# Patient Record
Sex: MALE | Race: WHITE | HISPANIC OR LATINO | Employment: FULL TIME | URBAN - METROPOLITAN AREA
[De-identification: names, ages, dates, MRNs, and addresses within clinical notes are randomized per-mention and may not be internally consistent; named-entity substitution may affect disease eponyms.]

---

## 2017-05-12 ENCOUNTER — TRANSCRIBE ORDERS (OUTPATIENT)
Dept: ADMINISTRATIVE | Facility: HOSPITAL | Age: 37
End: 2017-05-12

## 2017-05-12 DIAGNOSIS — N20.0 URIC ACID NEPHROLITHIASIS: Primary | ICD-10-CM

## 2017-05-12 DIAGNOSIS — R31.29 MICROSCOPIC HEMATURIA: ICD-10-CM

## 2017-05-19 ENCOUNTER — HOSPITAL ENCOUNTER (OUTPATIENT)
Dept: RADIOLOGY | Facility: HOSPITAL | Age: 37
Discharge: HOME | End: 2017-05-19
Attending: UROLOGY
Payer: COMMERCIAL

## 2017-05-19 DIAGNOSIS — N20.0 URIC ACID NEPHROLITHIASIS: ICD-10-CM

## 2017-05-19 DIAGNOSIS — R31.29 MICROSCOPIC HEMATURIA: ICD-10-CM

## 2017-05-19 PROCEDURE — 74176 CT ABD & PELVIS W/O CONTRAST: CPT

## 2017-08-04 ENCOUNTER — APPOINTMENT (OUTPATIENT)
Dept: LAB | Facility: HOSPITAL | Age: 37
End: 2017-08-04
Attending: UROLOGY
Payer: COMMERCIAL

## 2017-08-04 ENCOUNTER — TRANSCRIBE ORDERS (OUTPATIENT)
Dept: LAB | Facility: HOSPITAL | Age: 37
End: 2017-08-04

## 2017-08-04 DIAGNOSIS — Z01.818 PREOP EXAMINATION: Primary | ICD-10-CM

## 2017-08-04 DIAGNOSIS — Z01.818 PREOP EXAMINATION: ICD-10-CM

## 2017-08-04 LAB
ANION GAP SERPL CALCULATED.3IONS-SCNC: 7 MMOL/L (ref 4–13)
BUN SERPL-MCNC: 17 MG/DL (ref 5–25)
CALCIUM SERPL-MCNC: 9 MG/DL (ref 8.3–10.1)
CHLORIDE SERPL-SCNC: 104 MMOL/L (ref 100–108)
CO2 SERPL-SCNC: 30 MMOL/L (ref 21–32)
CREAT SERPL-MCNC: 1.19 MG/DL (ref 0.6–1.3)
ERYTHROCYTE [DISTWIDTH] IN BLOOD BY AUTOMATED COUNT: 13.7 % (ref 11.6–15.1)
GFR SERPL CREATININE-BSD FRML MDRD: 77 ML/MIN/1.73SQ M
GLUCOSE SERPL-MCNC: 96 MG/DL (ref 65–140)
HCT VFR BLD AUTO: 44.2 % (ref 36.5–49.3)
HGB BLD-MCNC: 15.2 G/DL (ref 12–17)
MCH RBC QN AUTO: 30.5 PG (ref 26.8–34.3)
MCHC RBC AUTO-ENTMCNC: 34.4 G/DL (ref 31.4–37.4)
MCV RBC AUTO: 89 FL (ref 82–98)
PLATELET # BLD AUTO: 251 THOUSANDS/UL (ref 149–390)
PMV BLD AUTO: 11.3 FL (ref 8.9–12.7)
POTASSIUM SERPL-SCNC: 4.1 MMOL/L (ref 3.5–5.3)
RBC # BLD AUTO: 4.99 MILLION/UL (ref 3.88–5.62)
SODIUM SERPL-SCNC: 141 MMOL/L (ref 136–145)
WBC # BLD AUTO: 6.99 THOUSAND/UL (ref 4.31–10.16)

## 2017-08-04 PROCEDURE — 85027 COMPLETE CBC AUTOMATED: CPT

## 2017-08-04 PROCEDURE — 80048 BASIC METABOLIC PNL TOTAL CA: CPT

## 2017-08-04 PROCEDURE — 36415 COLL VENOUS BLD VENIPUNCTURE: CPT

## 2017-08-07 ENCOUNTER — TRANSCRIBE ORDERS (OUTPATIENT)
Dept: ADMINISTRATIVE | Age: 37
End: 2017-08-07

## 2017-08-07 ENCOUNTER — APPOINTMENT (OUTPATIENT)
Dept: RADIOLOGY | Age: 37
End: 2017-08-07
Payer: COMMERCIAL

## 2017-08-07 DIAGNOSIS — N20.0 URIC ACID NEPHROLITHIASIS: ICD-10-CM

## 2017-08-07 DIAGNOSIS — N20.0 URIC ACID NEPHROLITHIASIS: Primary | ICD-10-CM

## 2017-08-07 PROCEDURE — 74000 HB X-RAY EXAM OF ABDOMEN (SINGLE ANTEROPOSTERIOR VIEW): CPT

## 2017-08-09 ENCOUNTER — ANESTHESIA EVENT (OUTPATIENT)
Dept: PERIOP | Facility: HOSPITAL | Age: 37
End: 2017-08-09
Payer: COMMERCIAL

## 2017-08-10 ENCOUNTER — HOSPITAL ENCOUNTER (OUTPATIENT)
Facility: HOSPITAL | Age: 37
Setting detail: OUTPATIENT SURGERY
Discharge: HOME | End: 2017-08-10
Attending: UROLOGY | Admitting: UROLOGY
Payer: COMMERCIAL

## 2017-08-10 ENCOUNTER — ANESTHESIA (OUTPATIENT)
Dept: PERIOP | Facility: HOSPITAL | Age: 37
End: 2017-08-10
Payer: COMMERCIAL

## 2017-08-10 VITALS
WEIGHT: 199 LBS | OXYGEN SATURATION: 94 % | HEART RATE: 55 BPM | RESPIRATION RATE: 18 BRPM | HEIGHT: 69 IN | SYSTOLIC BLOOD PRESSURE: 142 MMHG | TEMPERATURE: 97.9 F | DIASTOLIC BLOOD PRESSURE: 91 MMHG | BODY MASS INDEX: 29.47 KG/M2

## 2017-08-10 DIAGNOSIS — N20.0 RENAL CALCULI: ICD-10-CM

## 2017-08-10 PROCEDURE — 87086 URINE CULTURE/COLONY COUNT: CPT | Performed by: UROLOGY

## 2017-08-10 PROCEDURE — C2617 STENT, NON-COR, TEM W/O DEL: HCPCS | Performed by: UROLOGY

## 2017-08-10 PROCEDURE — C1769 GUIDE WIRE: HCPCS | Performed by: UROLOGY

## 2017-08-10 DEVICE — STENT URETERAL 4.7FR 26CM INLAY OPTIMA
Type: IMPLANTABLE DEVICE | Site: URETER | Status: NON-FUNCTIONAL
Removed: 2017-08-24

## 2017-08-10 RX ORDER — PROPOFOL 10 MG/ML
INJECTION, EMULSION INTRAVENOUS AS NEEDED
Status: DISCONTINUED | OUTPATIENT
Start: 2017-08-10 | End: 2017-08-10 | Stop reason: SURG

## 2017-08-10 RX ORDER — FENTANYL CITRATE/PF 50 MCG/ML
50 SYRINGE (ML) INJECTION
Status: DISCONTINUED | OUTPATIENT
Start: 2017-08-10 | End: 2017-08-10 | Stop reason: HOSPADM

## 2017-08-10 RX ORDER — SODIUM CHLORIDE, SODIUM LACTATE, POTASSIUM CHLORIDE, CALCIUM CHLORIDE 600; 310; 30; 20 MG/100ML; MG/100ML; MG/100ML; MG/100ML
20 INJECTION, SOLUTION INTRAVENOUS CONTINUOUS
Status: DISCONTINUED | OUTPATIENT
Start: 2017-08-10 | End: 2017-08-10 | Stop reason: HOSPADM

## 2017-08-10 RX ORDER — CEFUROXIME AXETIL 500 MG/1
500 TABLET ORAL EVERY 12 HOURS SCHEDULED
Qty: 6 TABLET | Refills: 0 | Status: SHIPPED | OUTPATIENT
Start: 2017-08-10 | End: 2017-08-13

## 2017-08-10 RX ORDER — SODIUM CHLORIDE, SODIUM LACTATE, POTASSIUM CHLORIDE, CALCIUM CHLORIDE 600; 310; 30; 20 MG/100ML; MG/100ML; MG/100ML; MG/100ML
250 INJECTION, SOLUTION INTRAVENOUS CONTINUOUS
Status: DISCONTINUED | OUTPATIENT
Start: 2017-08-10 | End: 2017-08-10 | Stop reason: HOSPADM

## 2017-08-10 RX ORDER — ONDANSETRON 2 MG/ML
4 INJECTION INTRAMUSCULAR; INTRAVENOUS ONCE AS NEEDED
Status: DISCONTINUED | OUTPATIENT
Start: 2017-08-10 | End: 2017-08-10 | Stop reason: HOSPADM

## 2017-08-10 RX ORDER — FENTANYL CITRATE 50 UG/ML
INJECTION, SOLUTION INTRAMUSCULAR; INTRAVENOUS AS NEEDED
Status: DISCONTINUED | OUTPATIENT
Start: 2017-08-10 | End: 2017-08-10 | Stop reason: SURG

## 2017-08-10 RX ORDER — MIDAZOLAM HYDROCHLORIDE 1 MG/ML
INJECTION INTRAMUSCULAR; INTRAVENOUS AS NEEDED
Status: DISCONTINUED | OUTPATIENT
Start: 2017-08-10 | End: 2017-08-10 | Stop reason: SURG

## 2017-08-10 RX ORDER — MAGNESIUM HYDROXIDE 1200 MG/15ML
LIQUID ORAL AS NEEDED
Status: DISCONTINUED | OUTPATIENT
Start: 2017-08-10 | End: 2017-08-10 | Stop reason: HOSPADM

## 2017-08-10 RX ORDER — METOCLOPRAMIDE HYDROCHLORIDE 5 MG/ML
10 INJECTION INTRAMUSCULAR; INTRAVENOUS ONCE AS NEEDED
Status: DISCONTINUED | OUTPATIENT
Start: 2017-08-10 | End: 2017-08-10 | Stop reason: HOSPADM

## 2017-08-10 RX ORDER — OXYCODONE HYDROCHLORIDE AND ACETAMINOPHEN 5; 325 MG/1; MG/1
1 TABLET ORAL EVERY 4 HOURS PRN
Qty: 18 TABLET | Refills: 0 | Status: SHIPPED | OUTPATIENT
Start: 2017-08-10 | End: 2017-08-15

## 2017-08-10 RX ORDER — ONDANSETRON 2 MG/ML
INJECTION INTRAMUSCULAR; INTRAVENOUS AS NEEDED
Status: DISCONTINUED | OUTPATIENT
Start: 2017-08-10 | End: 2017-08-10 | Stop reason: SURG

## 2017-08-10 RX ORDER — SODIUM CHLORIDE, SODIUM LACTATE, POTASSIUM CHLORIDE, CALCIUM CHLORIDE 600; 310; 30; 20 MG/100ML; MG/100ML; MG/100ML; MG/100ML
INJECTION, SOLUTION INTRAVENOUS CONTINUOUS PRN
Status: DISCONTINUED | OUTPATIENT
Start: 2017-08-10 | End: 2017-08-10 | Stop reason: SURG

## 2017-08-10 RX ORDER — OXYCODONE HYDROCHLORIDE AND ACETAMINOPHEN 5; 325 MG/1; MG/1
1 TABLET ORAL EVERY 4 HOURS PRN
Status: DISCONTINUED | OUTPATIENT
Start: 2017-08-10 | End: 2017-08-10 | Stop reason: HOSPADM

## 2017-08-10 RX ADMIN — DEXAMETHASONE SODIUM PHOSPHATE 8 MG: 10 INJECTION INTRAMUSCULAR; INTRAVENOUS at 07:46

## 2017-08-10 RX ADMIN — FENTANYL CITRATE 25 MCG: 50 INJECTION, SOLUTION INTRAMUSCULAR; INTRAVENOUS at 07:43

## 2017-08-10 RX ADMIN — SODIUM CHLORIDE, SODIUM LACTATE, POTASSIUM CHLORIDE, AND CALCIUM CHLORIDE 250 ML/HR: .6; .31; .03; .02 INJECTION, SOLUTION INTRAVENOUS at 09:27

## 2017-08-10 RX ADMIN — SODIUM CHLORIDE, SODIUM LACTATE, POTASSIUM CHLORIDE, AND CALCIUM CHLORIDE: .6; .31; .03; .02 INJECTION, SOLUTION INTRAVENOUS at 07:26

## 2017-08-10 RX ADMIN — FENTANYL CITRATE 25 MCG: 50 INJECTION, SOLUTION INTRAMUSCULAR; INTRAVENOUS at 07:45

## 2017-08-10 RX ADMIN — PROPOFOL 230 MG: 10 INJECTION, EMULSION INTRAVENOUS at 07:38

## 2017-08-10 RX ADMIN — OXYCODONE HYDROCHLORIDE AND ACETAMINOPHEN 1 TABLET: 5; 325 TABLET ORAL at 10:06

## 2017-08-10 RX ADMIN — MIDAZOLAM HYDROCHLORIDE 2 MG: 1 INJECTION, SOLUTION INTRAMUSCULAR; INTRAVENOUS at 07:26

## 2017-08-10 RX ADMIN — FENTANYL CITRATE 25 MCG: 50 INJECTION, SOLUTION INTRAMUSCULAR; INTRAVENOUS at 07:59

## 2017-08-10 RX ADMIN — ONDANSETRON 4 MG: 2 INJECTION INTRAMUSCULAR; INTRAVENOUS at 07:47

## 2017-08-10 RX ADMIN — FENTANYL CITRATE 25 MCG: 50 INJECTION, SOLUTION INTRAMUSCULAR; INTRAVENOUS at 07:41

## 2017-08-10 RX ADMIN — CEFAZOLIN SODIUM 2000 MG: 2 SOLUTION INTRAVENOUS at 07:30

## 2017-08-12 LAB — BACTERIA UR CULT: NORMAL

## 2017-08-17 ENCOUNTER — TRANSCRIBE ORDERS (OUTPATIENT)
Dept: RADIOLOGY | Facility: HOSPITAL | Age: 37
End: 2017-08-17

## 2017-08-17 ENCOUNTER — HOSPITAL ENCOUNTER (OUTPATIENT)
Dept: RADIOLOGY | Facility: HOSPITAL | Age: 37
Discharge: HOME | End: 2017-08-17
Attending: UROLOGY
Payer: COMMERCIAL

## 2017-08-17 DIAGNOSIS — N20.0 CALCULUS OF KIDNEY: Primary | ICD-10-CM

## 2017-08-17 DIAGNOSIS — N20.0 CALCULUS OF KIDNEY: ICD-10-CM

## 2017-08-17 PROCEDURE — 74000 HB X-RAY EXAM OF ABDOMEN (SINGLE ANTEROPOSTERIOR VIEW): CPT

## 2017-08-23 ENCOUNTER — ANESTHESIA EVENT (OUTPATIENT)
Dept: PERIOP | Facility: HOSPITAL | Age: 37
End: 2017-08-23
Payer: COMMERCIAL

## 2017-08-24 ENCOUNTER — HOSPITAL ENCOUNTER (OUTPATIENT)
Facility: HOSPITAL | Age: 37
Setting detail: OUTPATIENT SURGERY
Discharge: HOME | End: 2017-08-24
Attending: UROLOGY | Admitting: UROLOGY
Payer: COMMERCIAL

## 2017-08-24 ENCOUNTER — APPOINTMENT (OUTPATIENT)
Dept: RADIOLOGY | Facility: HOSPITAL | Age: 37
End: 2017-08-24
Attending: UROLOGY
Payer: COMMERCIAL

## 2017-08-24 ENCOUNTER — ANESTHESIA (OUTPATIENT)
Dept: PERIOP | Facility: HOSPITAL | Age: 37
End: 2017-08-24
Payer: COMMERCIAL

## 2017-08-24 ENCOUNTER — HOSPITAL ENCOUNTER (OUTPATIENT)
Dept: RADIOLOGY | Facility: HOSPITAL | Age: 37
Setting detail: OUTPATIENT SURGERY
Discharge: HOME | End: 2017-08-24
Payer: COMMERCIAL

## 2017-08-24 VITALS
HEART RATE: 61 BPM | RESPIRATION RATE: 16 BRPM | WEIGHT: 202 LBS | BODY MASS INDEX: 29.92 KG/M2 | SYSTOLIC BLOOD PRESSURE: 141 MMHG | TEMPERATURE: 98.8 F | HEIGHT: 69 IN | OXYGEN SATURATION: 95 % | DIASTOLIC BLOOD PRESSURE: 82 MMHG

## 2017-08-24 DIAGNOSIS — N20.1 CALCULUS OF URETER: ICD-10-CM

## 2017-08-24 DIAGNOSIS — N20.0 RENAL CALCULUS: ICD-10-CM

## 2017-08-24 PROCEDURE — C1769 GUIDE WIRE: HCPCS | Performed by: UROLOGY

## 2017-08-24 PROCEDURE — C2617 STENT, NON-COR, TEM W/O DEL: HCPCS | Performed by: UROLOGY

## 2017-08-24 PROCEDURE — 74420 UROGRAPHY RTRGR +-KUB: CPT

## 2017-08-24 PROCEDURE — 87086 URINE CULTURE/COLONY COUNT: CPT | Performed by: UROLOGY

## 2017-08-24 PROCEDURE — 82360 CALCULUS ASSAY QUANT: CPT | Performed by: UROLOGY

## 2017-08-24 PROCEDURE — 74000 HB X-RAY EXAM OF ABDOMEN (SINGLE ANTEROPOSTERIOR VIEW): CPT

## 2017-08-24 DEVICE — IMPLANTABLE DEVICE
Type: IMPLANTABLE DEVICE | Site: URETER | Status: NON-FUNCTIONAL
Removed: 2017-09-14

## 2017-08-24 RX ORDER — DEXAMETHASONE SODIUM PHOSPHATE 4 MG/ML
INJECTION, SOLUTION INTRA-ARTICULAR; INTRALESIONAL; INTRAMUSCULAR; INTRAVENOUS; SOFT TISSUE AS NEEDED
Status: DISCONTINUED | OUTPATIENT
Start: 2017-08-24 | End: 2017-08-24 | Stop reason: SURG

## 2017-08-24 RX ORDER — OXYCODONE HYDROCHLORIDE AND ACETAMINOPHEN 5; 325 MG/1; MG/1
1 TABLET ORAL EVERY 4 HOURS PRN
Status: DISCONTINUED | OUTPATIENT
Start: 2017-08-24 | End: 2017-08-24 | Stop reason: HOSPADM

## 2017-08-24 RX ORDER — MIDAZOLAM HYDROCHLORIDE 1 MG/ML
INJECTION INTRAMUSCULAR; INTRAVENOUS AS NEEDED
Status: DISCONTINUED | OUTPATIENT
Start: 2017-08-24 | End: 2017-08-24 | Stop reason: SURG

## 2017-08-24 RX ORDER — SODIUM CHLORIDE, SODIUM LACTATE, POTASSIUM CHLORIDE, CALCIUM CHLORIDE 600; 310; 30; 20 MG/100ML; MG/100ML; MG/100ML; MG/100ML
125 INJECTION, SOLUTION INTRAVENOUS CONTINUOUS
Status: DISCONTINUED | OUTPATIENT
Start: 2017-08-24 | End: 2017-08-24 | Stop reason: HOSPADM

## 2017-08-24 RX ORDER — DIPHENHYDRAMINE HYDROCHLORIDE 50 MG/ML
12.5 INJECTION INTRAMUSCULAR; INTRAVENOUS ONCE AS NEEDED
Status: DISCONTINUED | OUTPATIENT
Start: 2017-08-24 | End: 2017-08-24 | Stop reason: HOSPADM

## 2017-08-24 RX ORDER — ROCURONIUM BROMIDE 10 MG/ML
INJECTION, SOLUTION INTRAVENOUS AS NEEDED
Status: DISCONTINUED | OUTPATIENT
Start: 2017-08-24 | End: 2017-08-24 | Stop reason: SURG

## 2017-08-24 RX ORDER — MAGNESIUM HYDROXIDE 1200 MG/15ML
LIQUID ORAL AS NEEDED
Status: DISCONTINUED | OUTPATIENT
Start: 2017-08-24 | End: 2017-08-24 | Stop reason: HOSPADM

## 2017-08-24 RX ORDER — GLYCOPYRROLATE 0.2 MG/ML
INJECTION INTRAMUSCULAR; INTRAVENOUS AS NEEDED
Status: DISCONTINUED | OUTPATIENT
Start: 2017-08-24 | End: 2017-08-24 | Stop reason: SURG

## 2017-08-24 RX ORDER — OXYCODONE HYDROCHLORIDE AND ACETAMINOPHEN 5; 325 MG/1; MG/1
1 TABLET ORAL EVERY 4 HOURS PRN
Qty: 24 TABLET | Refills: 0 | Status: SHIPPED | OUTPATIENT
Start: 2017-08-24 | End: 2017-09-03

## 2017-08-24 RX ORDER — METOCLOPRAMIDE HYDROCHLORIDE 5 MG/ML
INJECTION INTRAMUSCULAR; INTRAVENOUS AS NEEDED
Status: DISCONTINUED | OUTPATIENT
Start: 2017-08-24 | End: 2017-08-24 | Stop reason: SURG

## 2017-08-24 RX ORDER — FENTANYL CITRATE/PF 50 MCG/ML
50 SYRINGE (ML) INJECTION
Status: DISCONTINUED | OUTPATIENT
Start: 2017-08-24 | End: 2017-08-24 | Stop reason: HOSPADM

## 2017-08-24 RX ORDER — PROPOFOL 10 MG/ML
INJECTION, EMULSION INTRAVENOUS AS NEEDED
Status: DISCONTINUED | OUTPATIENT
Start: 2017-08-24 | End: 2017-08-24 | Stop reason: SURG

## 2017-08-24 RX ORDER — LIDOCAINE HYDROCHLORIDE 10 MG/ML
INJECTION, SOLUTION INFILTRATION; PERINEURAL AS NEEDED
Status: DISCONTINUED | OUTPATIENT
Start: 2017-08-24 | End: 2017-08-24 | Stop reason: SURG

## 2017-08-24 RX ORDER — CEFUROXIME AXETIL 500 MG/1
500 TABLET ORAL EVERY 12 HOURS SCHEDULED
Qty: 10 TABLET | Refills: 0 | Status: SHIPPED | OUTPATIENT
Start: 2017-08-24 | End: 2017-08-29

## 2017-08-24 RX ORDER — ONDANSETRON 2 MG/ML
INJECTION INTRAMUSCULAR; INTRAVENOUS AS NEEDED
Status: DISCONTINUED | OUTPATIENT
Start: 2017-08-24 | End: 2017-08-24 | Stop reason: SURG

## 2017-08-24 RX ORDER — OXYCODONE HYDROCHLORIDE AND ACETAMINOPHEN 5; 325 MG/1; MG/1
1 TABLET ORAL EVERY 4 HOURS PRN
COMMUNITY
End: 2017-11-06 | Stop reason: ALTCHOICE

## 2017-08-24 RX ORDER — ONDANSETRON 2 MG/ML
4 INJECTION INTRAMUSCULAR; INTRAVENOUS ONCE AS NEEDED
Status: DISCONTINUED | OUTPATIENT
Start: 2017-08-24 | End: 2017-08-24 | Stop reason: HOSPADM

## 2017-08-24 RX ORDER — PROMETHAZINE HYDROCHLORIDE 25 MG/ML
6.25 INJECTION, SOLUTION INTRAMUSCULAR; INTRAVENOUS AS NEEDED
Status: DISCONTINUED | OUTPATIENT
Start: 2017-08-24 | End: 2017-08-24 | Stop reason: HOSPADM

## 2017-08-24 RX ORDER — ALBUTEROL SULFATE 2.5 MG/3ML
2.5 SOLUTION RESPIRATORY (INHALATION) AS NEEDED
Status: DISCONTINUED | OUTPATIENT
Start: 2017-08-24 | End: 2017-08-24 | Stop reason: HOSPADM

## 2017-08-24 RX ORDER — FENTANYL CITRATE 50 UG/ML
INJECTION, SOLUTION INTRAMUSCULAR; INTRAVENOUS AS NEEDED
Status: DISCONTINUED | OUTPATIENT
Start: 2017-08-24 | End: 2017-08-24 | Stop reason: SURG

## 2017-08-24 RX ORDER — IBUPROFEN 200 MG
TABLET ORAL EVERY 6 HOURS PRN
COMMUNITY
End: 2017-09-15

## 2017-08-24 RX ADMIN — FENTANYL CITRATE 50 MCG: 50 INJECTION, SOLUTION INTRAMUSCULAR; INTRAVENOUS at 10:30

## 2017-08-24 RX ADMIN — ROCURONIUM BROMIDE 5 MG: 10 INJECTION, SOLUTION INTRAVENOUS at 11:31

## 2017-08-24 RX ADMIN — FENTANYL CITRATE 50 MCG: 50 INJECTION, SOLUTION INTRAMUSCULAR; INTRAVENOUS at 10:21

## 2017-08-24 RX ADMIN — ROCURONIUM BROMIDE 35 MG: 10 INJECTION, SOLUTION INTRAVENOUS at 10:15

## 2017-08-24 RX ADMIN — FENTANYL CITRATE 50 MCG: 50 INJECTION, SOLUTION INTRAMUSCULAR; INTRAVENOUS at 11:31

## 2017-08-24 RX ADMIN — ROCURONIUM BROMIDE 5 MG: 10 INJECTION, SOLUTION INTRAVENOUS at 10:42

## 2017-08-24 RX ADMIN — NEOSTIGMINE METHYLSULFATE 3 MG: 1 INJECTION, SOLUTION INTRAMUSCULAR; INTRAVENOUS; SUBCUTANEOUS at 12:05

## 2017-08-24 RX ADMIN — OXYCODONE HYDROCHLORIDE AND ACETAMINOPHEN 1 TABLET: 5; 325 TABLET ORAL at 13:47

## 2017-08-24 RX ADMIN — GLYCOPYRROLATE 0.4 MG: 0.2 INJECTION, SOLUTION INTRAMUSCULAR; INTRAVENOUS at 12:05

## 2017-08-24 RX ADMIN — SODIUM CHLORIDE, SODIUM LACTATE, POTASSIUM CHLORIDE, AND CALCIUM CHLORIDE 125 ML/HR: .6; .31; .03; .02 INJECTION, SOLUTION INTRAVENOUS at 09:42

## 2017-08-24 RX ADMIN — FENTANYL CITRATE 50 MCG: 50 INJECTION, SOLUTION INTRAMUSCULAR; INTRAVENOUS at 11:55

## 2017-08-24 RX ADMIN — MIDAZOLAM HYDROCHLORIDE 2 MG: 1 INJECTION, SOLUTION INTRAMUSCULAR; INTRAVENOUS at 10:06

## 2017-08-24 RX ADMIN — ONDANSETRON 4 MG: 2 INJECTION INTRAMUSCULAR; INTRAVENOUS at 12:05

## 2017-08-24 RX ADMIN — ONDANSETRON 4 MG: 2 INJECTION INTRAMUSCULAR; INTRAVENOUS at 10:30

## 2017-08-24 RX ADMIN — DEXAMETHASONE SODIUM PHOSPHATE 4 MG: 4 INJECTION, SOLUTION INTRAMUSCULAR; INTRAVENOUS at 10:21

## 2017-08-24 RX ADMIN — ROCURONIUM BROMIDE 5 MG: 10 INJECTION, SOLUTION INTRAVENOUS at 11:06

## 2017-08-24 RX ADMIN — METOCLOPRAMIDE 10 MG: 5 INJECTION, SOLUTION INTRAMUSCULAR; INTRAVENOUS at 10:29

## 2017-08-24 RX ADMIN — LIDOCAINE HYDROCHLORIDE 50 MG: 10 INJECTION, SOLUTION INFILTRATION; PERINEURAL at 10:15

## 2017-08-24 RX ADMIN — PROPOFOL 200 MG: 10 INJECTION, EMULSION INTRAVENOUS at 10:15

## 2017-08-24 RX ADMIN — CEFAZOLIN SODIUM 2000 MG: 2 SOLUTION INTRAVENOUS at 10:15

## 2017-08-26 LAB — BACTERIA UR CULT: NORMAL

## 2017-08-31 ENCOUNTER — TRANSCRIBE ORDERS (OUTPATIENT)
Dept: RADIOLOGY | Facility: HOSPITAL | Age: 37
End: 2017-08-31

## 2017-08-31 ENCOUNTER — HOSPITAL ENCOUNTER (OUTPATIENT)
Dept: RADIOLOGY | Facility: HOSPITAL | Age: 37
Discharge: HOME | End: 2017-08-31
Attending: UROLOGY
Payer: COMMERCIAL

## 2017-08-31 DIAGNOSIS — N20.0 KIDNEY STONE: ICD-10-CM

## 2017-08-31 DIAGNOSIS — N20.0 KIDNEY STONE: Primary | ICD-10-CM

## 2017-08-31 PROCEDURE — 74000 HB X-RAY EXAM OF ABDOMEN (SINGLE ANTEROPOSTERIOR VIEW): CPT

## 2017-09-06 LAB
CA PHOS MFR STONE: 5 %
CALCIUM OXALATE DIHYDRATE MFR STONE IR: 5 %
COLOR STONE: NORMAL
COM MFR STONE: 90 %
COMMENT-STONE3: NORMAL
COMPOSITION: NORMAL
LABORATORY COMMENT REPORT: NORMAL
NIDUS STONE QL: NORMAL
PHOTO: NORMAL
SIZE STONE: NORMAL MM
STONE ANALYSIS-IMP: NORMAL
STONE ANALYSIS-IMP: NORMAL
WT STONE: 33 MG

## 2017-09-14 ENCOUNTER — ANESTHESIA (OUTPATIENT)
Dept: PERIOP | Facility: HOSPITAL | Age: 37
End: 2017-09-14
Payer: COMMERCIAL

## 2017-09-14 ENCOUNTER — HOSPITAL ENCOUNTER (OUTPATIENT)
Facility: HOSPITAL | Age: 37
Setting detail: OUTPATIENT SURGERY
Discharge: HOME | End: 2017-09-14
Attending: UROLOGY | Admitting: UROLOGY
Payer: COMMERCIAL

## 2017-09-14 ENCOUNTER — HOSPITAL ENCOUNTER (OUTPATIENT)
Facility: HOSPITAL | Age: 37
Setting detail: OBSERVATION
Discharge: HOME | End: 2017-09-15
Attending: EMERGENCY MEDICINE | Admitting: UROLOGY
Payer: COMMERCIAL

## 2017-09-14 ENCOUNTER — APPOINTMENT (OUTPATIENT)
Dept: RADIOLOGY | Facility: HOSPITAL | Age: 37
End: 2017-09-14
Payer: COMMERCIAL

## 2017-09-14 ENCOUNTER — ANESTHESIA EVENT (OUTPATIENT)
Dept: PERIOP | Facility: HOSPITAL | Age: 37
End: 2017-09-14
Payer: COMMERCIAL

## 2017-09-14 VITALS
OXYGEN SATURATION: 99 % | DIASTOLIC BLOOD PRESSURE: 70 MMHG | HEIGHT: 69 IN | RESPIRATION RATE: 16 BRPM | HEART RATE: 74 BPM | TEMPERATURE: 98.4 F | BODY MASS INDEX: 29.77 KG/M2 | WEIGHT: 201 LBS | SYSTOLIC BLOOD PRESSURE: 128 MMHG

## 2017-09-14 DIAGNOSIS — R11.2 NAUSEA AND VOMITING: ICD-10-CM

## 2017-09-14 DIAGNOSIS — R10.9 FLANK PAIN, ACUTE: Primary | ICD-10-CM

## 2017-09-14 DIAGNOSIS — N20.1 URETERAL CALCULI: ICD-10-CM

## 2017-09-14 PROCEDURE — C2617 STENT, NON-COR, TEM W/O DEL: HCPCS | Performed by: UROLOGY

## 2017-09-14 PROCEDURE — 74420 UROGRAPHY RTRGR +-KUB: CPT

## 2017-09-14 PROCEDURE — C1769 GUIDE WIRE: HCPCS | Performed by: UROLOGY

## 2017-09-14 PROCEDURE — 87086 URINE CULTURE/COLONY COUNT: CPT | Performed by: UROLOGY

## 2017-09-14 PROCEDURE — 81002 URINALYSIS NONAUTO W/O SCOPE: CPT | Performed by: EMERGENCY MEDICINE

## 2017-09-14 PROCEDURE — 82360 CALCULUS ASSAY QUANT: CPT | Performed by: UROLOGY

## 2017-09-14 DEVICE — STENT URETERAL 4.7FR 26CM INLAY OPTIMA
Type: IMPLANTABLE DEVICE | Site: URETER | Status: NON-FUNCTIONAL
Removed: 2017-11-09

## 2017-09-14 RX ORDER — OXYCODONE HYDROCHLORIDE AND ACETAMINOPHEN 5; 325 MG/1; MG/1
1 TABLET ORAL EVERY 4 HOURS PRN
Status: DISCONTINUED | OUTPATIENT
Start: 2017-09-14 | End: 2017-09-14 | Stop reason: HOSPADM

## 2017-09-14 RX ORDER — SODIUM CHLORIDE, SODIUM LACTATE, POTASSIUM CHLORIDE, CALCIUM CHLORIDE 600; 310; 30; 20 MG/100ML; MG/100ML; MG/100ML; MG/100ML
50 INJECTION, SOLUTION INTRAVENOUS CONTINUOUS
Status: DISCONTINUED | OUTPATIENT
Start: 2017-09-14 | End: 2017-09-14 | Stop reason: HOSPADM

## 2017-09-14 RX ORDER — PROPOFOL 10 MG/ML
INJECTION, EMULSION INTRAVENOUS AS NEEDED
Status: DISCONTINUED | OUTPATIENT
Start: 2017-09-14 | End: 2017-09-14 | Stop reason: SURG

## 2017-09-14 RX ORDER — OXYCODONE HYDROCHLORIDE AND ACETAMINOPHEN 5; 325 MG/1; MG/1
1 TABLET ORAL EVERY 4 HOURS PRN
Qty: 18 TABLET | Refills: 0 | Status: SHIPPED | OUTPATIENT
Start: 2017-09-14 | End: 2017-09-15

## 2017-09-14 RX ORDER — ONDANSETRON 2 MG/ML
INJECTION INTRAMUSCULAR; INTRAVENOUS AS NEEDED
Status: DISCONTINUED | OUTPATIENT
Start: 2017-09-14 | End: 2017-09-14 | Stop reason: SURG

## 2017-09-14 RX ORDER — FENTANYL CITRATE 50 UG/ML
INJECTION, SOLUTION INTRAMUSCULAR; INTRAVENOUS AS NEEDED
Status: DISCONTINUED | OUTPATIENT
Start: 2017-09-14 | End: 2017-09-14 | Stop reason: SURG

## 2017-09-14 RX ORDER — SODIUM CHLORIDE, SODIUM LACTATE, POTASSIUM CHLORIDE, CALCIUM CHLORIDE 600; 310; 30; 20 MG/100ML; MG/100ML; MG/100ML; MG/100ML
20 INJECTION, SOLUTION INTRAVENOUS CONTINUOUS
Status: DISCONTINUED | OUTPATIENT
Start: 2017-09-14 | End: 2017-09-14 | Stop reason: HOSPADM

## 2017-09-14 RX ORDER — MAGNESIUM HYDROXIDE 1200 MG/15ML
LIQUID ORAL AS NEEDED
Status: DISCONTINUED | OUTPATIENT
Start: 2017-09-14 | End: 2017-09-14 | Stop reason: HOSPADM

## 2017-09-14 RX ORDER — SODIUM CHLORIDE, SODIUM LACTATE, POTASSIUM CHLORIDE, CALCIUM CHLORIDE 600; 310; 30; 20 MG/100ML; MG/100ML; MG/100ML; MG/100ML
250 INJECTION, SOLUTION INTRAVENOUS CONTINUOUS
Status: CANCELLED | OUTPATIENT
Start: 2017-09-14

## 2017-09-14 RX ORDER — FUROSEMIDE 10 MG/ML
20 INJECTION INTRAMUSCULAR; INTRAVENOUS ONCE
Status: COMPLETED | OUTPATIENT
Start: 2017-09-14 | End: 2017-09-14

## 2017-09-14 RX ORDER — ONDANSETRON 2 MG/ML
4 INJECTION INTRAMUSCULAR; INTRAVENOUS ONCE AS NEEDED
Status: DISCONTINUED | OUTPATIENT
Start: 2017-09-14 | End: 2017-09-14 | Stop reason: HOSPADM

## 2017-09-14 RX ORDER — LIDOCAINE HYDROCHLORIDE 10 MG/ML
INJECTION, SOLUTION INFILTRATION; PERINEURAL AS NEEDED
Status: DISCONTINUED | OUTPATIENT
Start: 2017-09-14 | End: 2017-09-14 | Stop reason: SURG

## 2017-09-14 RX ORDER — FENTANYL CITRATE/PF 50 MCG/ML
25 SYRINGE (ML) INJECTION
Status: DISCONTINUED | OUTPATIENT
Start: 2017-09-14 | End: 2017-09-14 | Stop reason: HOSPADM

## 2017-09-14 RX ORDER — CEFUROXIME AXETIL 500 MG/1
500 TABLET ORAL EVERY 12 HOURS SCHEDULED
Qty: 6 TABLET | Refills: 0 | Status: SHIPPED | OUTPATIENT
Start: 2017-09-14 | End: 2017-09-17

## 2017-09-14 RX ADMIN — LIDOCAINE HYDROCHLORIDE 50 MG: 10 INJECTION, SOLUTION INFILTRATION; PERINEURAL at 12:25

## 2017-09-14 RX ADMIN — FUROSEMIDE 20 MG: 10 INJECTION, SOLUTION INTRAMUSCULAR; INTRAVENOUS at 14:57

## 2017-09-14 RX ADMIN — SODIUM CHLORIDE, SODIUM LACTATE, POTASSIUM CHLORIDE, AND CALCIUM CHLORIDE 50 ML/HR: .6; .31; .03; .02 INJECTION, SOLUTION INTRAVENOUS at 10:59

## 2017-09-14 RX ADMIN — FENTANYL CITRATE 25 MCG: 50 INJECTION, SOLUTION INTRAMUSCULAR; INTRAVENOUS at 12:50

## 2017-09-14 RX ADMIN — ONDANSETRON 4 MG: 2 INJECTION INTRAMUSCULAR; INTRAVENOUS at 12:25

## 2017-09-14 RX ADMIN — CEFAZOLIN SODIUM 2000 MG: 2 SOLUTION INTRAVENOUS at 12:31

## 2017-09-14 RX ADMIN — SODIUM CHLORIDE, SODIUM LACTATE, POTASSIUM CHLORIDE, AND CALCIUM CHLORIDE: .6; .31; .03; .02 INJECTION, SOLUTION INTRAVENOUS at 13:30

## 2017-09-14 RX ADMIN — FENTANYL CITRATE 25 MCG: 50 INJECTION, SOLUTION INTRAMUSCULAR; INTRAVENOUS at 13:59

## 2017-09-14 RX ADMIN — DEXAMETHASONE SODIUM PHOSPHATE 10 MG: 10 INJECTION INTRAMUSCULAR; INTRAVENOUS at 12:25

## 2017-09-14 RX ADMIN — PROPOFOL 200 MG: 10 INJECTION, EMULSION INTRAVENOUS at 12:25

## 2017-09-14 RX ADMIN — FENTANYL CITRATE 25 MCG: 50 INJECTION INTRAMUSCULAR; INTRAVENOUS at 15:08

## 2017-09-14 RX ADMIN — FENTANYL CITRATE 50 MCG: 50 INJECTION, SOLUTION INTRAMUSCULAR; INTRAVENOUS at 12:25

## 2017-09-15 VITALS
TEMPERATURE: 98.3 F | OXYGEN SATURATION: 96 % | RESPIRATION RATE: 18 BRPM | HEART RATE: 84 BPM | WEIGHT: 200.62 LBS | BODY MASS INDEX: 29.63 KG/M2 | SYSTOLIC BLOOD PRESSURE: 104 MMHG | DIASTOLIC BLOOD PRESSURE: 58 MMHG

## 2017-09-15 LAB
ALBUMIN SERPL BCP-MCNC: 3.6 G/DL (ref 3.5–5)
ALP SERPL-CCNC: 93 U/L (ref 46–116)
ALT SERPL W P-5'-P-CCNC: 18 U/L (ref 12–78)
ANION GAP SERPL CALCULATED.3IONS-SCNC: 7 MMOL/L (ref 4–13)
AST SERPL W P-5'-P-CCNC: 17 U/L (ref 5–45)
BACTERIA UR QL AUTO: ABNORMAL /HPF
BASOPHILS # BLD AUTO: 0 THOUSANDS/ΜL (ref 0–0.1)
BASOPHILS NFR BLD AUTO: 0 % (ref 0–1)
BILIRUB SERPL-MCNC: 0.43 MG/DL (ref 0.2–1)
BILIRUB UR QL STRIP: NEGATIVE
BUN SERPL-MCNC: 16 MG/DL (ref 5–25)
CALCIUM SERPL-MCNC: 8.7 MG/DL (ref 8.3–10.1)
CHLORIDE SERPL-SCNC: 102 MMOL/L (ref 100–108)
CLARITY UR: ABNORMAL
CLARITY, POC: NORMAL
CO2 SERPL-SCNC: 28 MMOL/L (ref 21–32)
COLOR UR: ABNORMAL
COLOR, POC: NORMAL
CREAT SERPL-MCNC: 1.3 MG/DL (ref 0.6–1.3)
EOSINOPHIL # BLD AUTO: 0 THOUSAND/ΜL (ref 0–0.61)
EOSINOPHIL NFR BLD AUTO: 0 % (ref 0–6)
ERYTHROCYTE [DISTWIDTH] IN BLOOD BY AUTOMATED COUNT: 13.9 % (ref 11.6–15.1)
GFR SERPL CREATININE-BSD FRML MDRD: 69 ML/MIN/1.73SQ M
GLUCOSE SERPL-MCNC: 133 MG/DL (ref 65–140)
GLUCOSE UR STRIP-MCNC: NEGATIVE MG/DL
HCT VFR BLD AUTO: 44.3 % (ref 36.5–49.3)
HGB BLD-MCNC: 15.3 G/DL (ref 12–17)
HGB UR QL STRIP.AUTO: ABNORMAL
HYALINE CASTS #/AREA URNS LPF: ABNORMAL /LPF
KETONES UR STRIP-MCNC: NEGATIVE MG/DL
LACTATE SERPL-SCNC: 0.9 MMOL/L (ref 0.5–2)
LACTATE SERPL-SCNC: 2.8 MMOL/L (ref 0.5–2)
LEUKOCYTE ESTERASE UR QL STRIP: ABNORMAL
LIPASE SERPL-CCNC: 92 U/L (ref 73–393)
LYMPHOCYTES # BLD AUTO: 1.35 THOUSANDS/ΜL (ref 0.6–4.47)
LYMPHOCYTES NFR BLD AUTO: 11 % (ref 14–44)
MCH RBC QN AUTO: 30.7 PG (ref 26.8–34.3)
MCHC RBC AUTO-ENTMCNC: 34.5 G/DL (ref 31.4–37.4)
MCV RBC AUTO: 89 FL (ref 82–98)
MONOCYTES # BLD AUTO: 0.65 THOUSAND/ΜL (ref 0.17–1.22)
MONOCYTES NFR BLD AUTO: 5 % (ref 4–12)
NEUTROPHILS # BLD AUTO: 10.16 THOUSANDS/ΜL (ref 1.85–7.62)
NEUTS SEG NFR BLD AUTO: 84 % (ref 43–75)
NITRITE UR QL STRIP: NEGATIVE
NON-SQ EPI CELLS URNS QL MICRO: ABNORMAL /HPF
NRBC BLD AUTO-RTO: 0 /100 WBCS
PH UR STRIP.AUTO: 7 [PH] (ref 4.5–8)
PLATELET # BLD AUTO: 229 THOUSANDS/UL (ref 149–390)
PMV BLD AUTO: 10.4 FL (ref 8.9–12.7)
POTASSIUM SERPL-SCNC: 3.8 MMOL/L (ref 3.5–5.3)
PROT SERPL-MCNC: 7.8 G/DL (ref 6.4–8.2)
PROT UR STRIP-MCNC: ABNORMAL MG/DL
RBC # BLD AUTO: 4.99 MILLION/UL (ref 3.88–5.62)
RBC #/AREA URNS AUTO: ABNORMAL /HPF
SODIUM SERPL-SCNC: 137 MMOL/L (ref 136–145)
SP GR UR STRIP.AUTO: 1.01 (ref 1–1.03)
UROBILINOGEN UR QL STRIP.AUTO: 0.2 E.U./DL
WBC # BLD AUTO: 12.18 THOUSAND/UL (ref 4.31–10.16)
WBC #/AREA URNS AUTO: ABNORMAL /HPF

## 2017-09-15 PROCEDURE — 36415 COLL VENOUS BLD VENIPUNCTURE: CPT | Performed by: EMERGENCY MEDICINE

## 2017-09-15 PROCEDURE — 87086 URINE CULTURE/COLONY COUNT: CPT

## 2017-09-15 PROCEDURE — 99284 EMERGENCY DEPT VISIT MOD MDM: CPT

## 2017-09-15 PROCEDURE — 80053 COMPREHEN METABOLIC PANEL: CPT | Performed by: EMERGENCY MEDICINE

## 2017-09-15 PROCEDURE — 96375 TX/PRO/DX INJ NEW DRUG ADDON: CPT

## 2017-09-15 PROCEDURE — 85025 COMPLETE CBC W/AUTO DIFF WBC: CPT | Performed by: EMERGENCY MEDICINE

## 2017-09-15 PROCEDURE — 96374 THER/PROPH/DIAG INJ IV PUSH: CPT

## 2017-09-15 PROCEDURE — 81001 URINALYSIS AUTO W/SCOPE: CPT

## 2017-09-15 PROCEDURE — 83605 ASSAY OF LACTIC ACID: CPT | Performed by: EMERGENCY MEDICINE

## 2017-09-15 PROCEDURE — 96361 HYDRATE IV INFUSION ADD-ON: CPT

## 2017-09-15 PROCEDURE — 83690 ASSAY OF LIPASE: CPT | Performed by: EMERGENCY MEDICINE

## 2017-09-15 RX ORDER — KETOROLAC TROMETHAMINE 30 MG/ML
15 INJECTION, SOLUTION INTRAMUSCULAR; INTRAVENOUS ONCE
Status: COMPLETED | OUTPATIENT
Start: 2017-09-15 | End: 2017-09-15

## 2017-09-15 RX ORDER — ONDANSETRON 2 MG/ML
4 INJECTION INTRAMUSCULAR; INTRAVENOUS ONCE
Status: COMPLETED | OUTPATIENT
Start: 2017-09-15 | End: 2017-09-15

## 2017-09-15 RX ORDER — ONDANSETRON 2 MG/ML
4 INJECTION INTRAMUSCULAR; INTRAVENOUS EVERY 6 HOURS PRN
Status: DISCONTINUED | OUTPATIENT
Start: 2017-09-15 | End: 2017-09-15 | Stop reason: HOSPADM

## 2017-09-15 RX ORDER — MORPHINE SULFATE 4 MG/ML
4 INJECTION, SOLUTION INTRAMUSCULAR; INTRAVENOUS EVERY 2 HOUR PRN
Status: DISCONTINUED | OUTPATIENT
Start: 2017-09-15 | End: 2017-09-15 | Stop reason: HOSPADM

## 2017-09-15 RX ORDER — OXYCODONE HYDROCHLORIDE AND ACETAMINOPHEN 5; 325 MG/1; MG/1
1 TABLET ORAL EVERY 4 HOURS PRN
Status: DISCONTINUED | OUTPATIENT
Start: 2017-09-15 | End: 2017-09-15 | Stop reason: HOSPADM

## 2017-09-15 RX ORDER — SODIUM CHLORIDE 9 MG/ML
125 INJECTION, SOLUTION INTRAVENOUS CONTINUOUS
Status: DISCONTINUED | OUTPATIENT
Start: 2017-09-15 | End: 2017-09-15 | Stop reason: HOSPADM

## 2017-09-15 RX ADMIN — LIDOCAINE HYDROCHLORIDE 137 MG: 10 INJECTION, SOLUTION INFILTRATION; PERINEURAL at 02:08

## 2017-09-15 RX ADMIN — HYDROMORPHONE HYDROCHLORIDE 1 MG: 1 INJECTION, SOLUTION INTRAMUSCULAR; INTRAVENOUS; SUBCUTANEOUS at 01:01

## 2017-09-15 RX ADMIN — OXYCODONE HYDROCHLORIDE AND ACETAMINOPHEN 1 TABLET: 5; 325 TABLET ORAL at 10:07

## 2017-09-15 RX ADMIN — ONDANSETRON 4 MG: 2 INJECTION INTRAMUSCULAR; INTRAVENOUS at 10:07

## 2017-09-15 RX ADMIN — SODIUM CHLORIDE 125 ML/HR: 0.9 INJECTION, SOLUTION INTRAVENOUS at 06:50

## 2017-09-15 RX ADMIN — SODIUM CHLORIDE 1000 ML: 0.9 INJECTION, SOLUTION INTRAVENOUS at 02:12

## 2017-09-15 RX ADMIN — KETOROLAC TROMETHAMINE 15 MG: 30 INJECTION, SOLUTION INTRAMUSCULAR at 00:52

## 2017-09-15 RX ADMIN — SODIUM CHLORIDE 1000 ML: 0.9 INJECTION, SOLUTION INTRAVENOUS at 00:45

## 2017-09-15 RX ADMIN — ONDANSETRON 4 MG: 2 INJECTION INTRAMUSCULAR; INTRAVENOUS at 00:49

## 2017-09-16 LAB
BACTERIA UR CULT: NORMAL
BACTERIA UR CULT: NORMAL

## 2017-09-25 ENCOUNTER — APPOINTMENT (OUTPATIENT)
Dept: RADIOLOGY | Age: 37
End: 2017-09-25
Payer: COMMERCIAL

## 2017-09-25 ENCOUNTER — TRANSCRIBE ORDERS (OUTPATIENT)
Dept: ADMINISTRATIVE | Age: 37
End: 2017-09-25

## 2017-09-25 DIAGNOSIS — N20.0 URIC ACID NEPHROLITHIASIS: ICD-10-CM

## 2017-09-25 DIAGNOSIS — N20.0 URIC ACID NEPHROLITHIASIS: Primary | ICD-10-CM

## 2017-09-25 LAB
CA PHOS MFR STONE: 20 %
CALCIUM OXALATE DIHYDRATE MFR STONE IR: 15 %
COLOR STONE: NORMAL
COM MFR STONE: 65 %
COMMENT-STONE3: NORMAL
COMPOSITION: NORMAL
LABORATORY COMMENT REPORT: NORMAL
NIDUS STONE QL: NORMAL
PHOTO: NORMAL
SIZE STONE: NORMAL MM
STONE ANALYSIS-IMP: NORMAL
STONE ANALYSIS-IMP: NORMAL
WT STONE: 178 MG

## 2017-09-25 PROCEDURE — 74000 HB X-RAY EXAM OF ABDOMEN (SINGLE ANTEROPOSTERIOR VIEW): CPT

## 2017-10-27 ENCOUNTER — APPOINTMENT (OUTPATIENT)
Dept: LAB | Facility: HOSPITAL | Age: 37
End: 2017-10-27
Attending: UROLOGY
Payer: COMMERCIAL

## 2017-10-27 ENCOUNTER — TRANSCRIBE ORDERS (OUTPATIENT)
Dept: LAB | Facility: HOSPITAL | Age: 37
End: 2017-10-27

## 2017-10-27 DIAGNOSIS — Z01.818 PREOP EXAMINATION: ICD-10-CM

## 2017-10-27 DIAGNOSIS — Z01.818 PREOP EXAMINATION: Primary | ICD-10-CM

## 2017-10-27 DIAGNOSIS — N20.0 URIC ACID NEPHROLITHIASIS: ICD-10-CM

## 2017-10-27 LAB
ANION GAP SERPL CALCULATED.3IONS-SCNC: 4 MMOL/L (ref 4–13)
BUN SERPL-MCNC: 15 MG/DL (ref 5–25)
CALCIUM SERPL-MCNC: 8.7 MG/DL (ref 8.3–10.1)
CHLORIDE SERPL-SCNC: 105 MMOL/L (ref 100–108)
CO2 SERPL-SCNC: 29 MMOL/L (ref 21–32)
CREAT SERPL-MCNC: 1.03 MG/DL (ref 0.6–1.3)
ERYTHROCYTE [DISTWIDTH] IN BLOOD BY AUTOMATED COUNT: 14.2 % (ref 11.6–15.1)
GFR SERPL CREATININE-BSD FRML MDRD: 92 ML/MIN/1.73SQ M
GLUCOSE SERPL-MCNC: 84 MG/DL (ref 65–140)
HCT VFR BLD AUTO: 46.5 % (ref 36.5–49.3)
HGB BLD-MCNC: 16.2 G/DL (ref 12–17)
MCH RBC QN AUTO: 31 PG (ref 26.8–34.3)
MCHC RBC AUTO-ENTMCNC: 34.8 G/DL (ref 31.4–37.4)
MCV RBC AUTO: 89 FL (ref 82–98)
PLATELET # BLD AUTO: 214 THOUSANDS/UL (ref 149–390)
PMV BLD AUTO: 10.9 FL (ref 8.9–12.7)
POTASSIUM SERPL-SCNC: 4.2 MMOL/L (ref 3.5–5.3)
RBC # BLD AUTO: 5.22 MILLION/UL (ref 3.88–5.62)
SODIUM SERPL-SCNC: 138 MMOL/L (ref 136–145)
WBC # BLD AUTO: 7.57 THOUSAND/UL (ref 4.31–10.16)

## 2017-10-27 PROCEDURE — 80048 BASIC METABOLIC PNL TOTAL CA: CPT

## 2017-10-27 PROCEDURE — 85027 COMPLETE CBC AUTOMATED: CPT

## 2017-10-27 PROCEDURE — 36415 COLL VENOUS BLD VENIPUNCTURE: CPT

## 2017-11-03 ENCOUNTER — HOSPITAL ENCOUNTER (OUTPATIENT)
Dept: RADIOLOGY | Facility: HOSPITAL | Age: 37
Discharge: HOME | End: 2017-11-03
Attending: UROLOGY
Payer: COMMERCIAL

## 2017-11-03 DIAGNOSIS — N20.0 URIC ACID NEPHROLITHIASIS: ICD-10-CM

## 2017-11-03 PROCEDURE — 74000 HB X-RAY EXAM OF ABDOMEN (SINGLE ANTEROPOSTERIOR VIEW): CPT

## 2017-11-06 NOTE — PRE-PROCEDURE INSTRUCTIONS
No outpatient prescriptions have been marked as taking for the 11/9/17 encounter [facility] Encounter)  INSTR ON ARI CALL, ASC LOC, BRING PHOTO ID/ INS INFO/MED LIST, SHOWER INSTR, NO ASA/NSAID/VIT NOW ON   ON NO DAILY MEDS

## 2017-11-08 ENCOUNTER — ANESTHESIA EVENT (OUTPATIENT)
Dept: PERIOP | Facility: HOSPITAL | Age: 37
End: 2017-11-08
Payer: COMMERCIAL

## 2017-11-09 ENCOUNTER — APPOINTMENT (OUTPATIENT)
Dept: RADIOLOGY | Facility: HOSPITAL | Age: 37
End: 2017-11-09
Payer: COMMERCIAL

## 2017-11-09 ENCOUNTER — ANESTHESIA (OUTPATIENT)
Dept: PERIOP | Facility: HOSPITAL | Age: 37
End: 2017-11-09
Payer: COMMERCIAL

## 2017-11-09 ENCOUNTER — HOSPITAL ENCOUNTER (OUTPATIENT)
Facility: HOSPITAL | Age: 37
Setting detail: OUTPATIENT SURGERY
Discharge: HOME | End: 2017-11-09
Attending: UROLOGY | Admitting: UROLOGY
Payer: COMMERCIAL

## 2017-11-09 VITALS
RESPIRATION RATE: 16 BRPM | DIASTOLIC BLOOD PRESSURE: 80 MMHG | BODY MASS INDEX: 29.47 KG/M2 | TEMPERATURE: 98.2 F | OXYGEN SATURATION: 96 % | WEIGHT: 199 LBS | HEART RATE: 69 BPM | SYSTOLIC BLOOD PRESSURE: 142 MMHG | HEIGHT: 69 IN

## 2017-11-09 DIAGNOSIS — N20.0 CALCULUS OF KIDNEY: ICD-10-CM

## 2017-11-09 PROBLEM — N20.1 RIGHT URETERAL CALCULUS: Status: ACTIVE | Noted: 2017-11-09

## 2017-11-09 PROCEDURE — C2617 STENT, NON-COR, TEM W/O DEL: HCPCS | Performed by: UROLOGY

## 2017-11-09 PROCEDURE — 74420 UROGRAPHY RTRGR +-KUB: CPT

## 2017-11-09 PROCEDURE — 82360 CALCULUS ASSAY QUANT: CPT | Performed by: UROLOGY

## 2017-11-09 PROCEDURE — 87086 URINE CULTURE/COLONY COUNT: CPT | Performed by: UROLOGY

## 2017-11-09 PROCEDURE — C1769 GUIDE WIRE: HCPCS | Performed by: UROLOGY

## 2017-11-09 DEVICE — STENT URETERAL 6 FR 28CM INLAY OPTIMA: Type: IMPLANTABLE DEVICE | Site: KIDNEY | Status: FUNCTIONAL

## 2017-11-09 RX ORDER — CEFUROXIME AXETIL 500 MG/1
500 TABLET ORAL EVERY 12 HOURS SCHEDULED
Qty: 10 TABLET | Refills: 0 | Status: SHIPPED | OUTPATIENT
Start: 2017-11-09 | End: 2017-11-14

## 2017-11-09 RX ORDER — SODIUM CHLORIDE 450 MG/100ML
150 INJECTION, SOLUTION INTRAVENOUS CONTINUOUS
Status: DISCONTINUED | OUTPATIENT
Start: 2017-11-09 | End: 2017-11-09 | Stop reason: HOSPADM

## 2017-11-09 RX ORDER — SODIUM CHLORIDE 9 MG/ML
125 INJECTION, SOLUTION INTRAVENOUS CONTINUOUS
Status: DISCONTINUED | OUTPATIENT
Start: 2017-11-09 | End: 2017-11-09

## 2017-11-09 RX ORDER — ONDANSETRON 2 MG/ML
INJECTION INTRAMUSCULAR; INTRAVENOUS AS NEEDED
Status: DISCONTINUED | OUTPATIENT
Start: 2017-11-09 | End: 2017-11-09 | Stop reason: SURG

## 2017-11-09 RX ORDER — OXYCODONE HYDROCHLORIDE AND ACETAMINOPHEN 5; 325 MG/1; MG/1
1 TABLET ORAL EVERY 4 HOURS PRN
Status: DISCONTINUED | OUTPATIENT
Start: 2017-11-09 | End: 2017-11-09 | Stop reason: HOSPADM

## 2017-11-09 RX ORDER — ONDANSETRON 2 MG/ML
4 INJECTION INTRAMUSCULAR; INTRAVENOUS EVERY 6 HOURS PRN
Status: DISCONTINUED | OUTPATIENT
Start: 2017-11-09 | End: 2017-11-09 | Stop reason: HOSPADM

## 2017-11-09 RX ORDER — MAGNESIUM HYDROXIDE 1200 MG/15ML
LIQUID ORAL AS NEEDED
Status: DISCONTINUED | OUTPATIENT
Start: 2017-11-09 | End: 2017-11-09 | Stop reason: HOSPADM

## 2017-11-09 RX ORDER — LIDOCAINE HYDROCHLORIDE 10 MG/ML
INJECTION, SOLUTION INFILTRATION; PERINEURAL AS NEEDED
Status: DISCONTINUED | OUTPATIENT
Start: 2017-11-09 | End: 2017-11-09 | Stop reason: SURG

## 2017-11-09 RX ORDER — FENTANYL CITRATE/PF 50 MCG/ML
25 SYRINGE (ML) INJECTION
Status: DISCONTINUED | OUTPATIENT
Start: 2017-11-09 | End: 2017-11-09 | Stop reason: HOSPADM

## 2017-11-09 RX ORDER — ROCURONIUM BROMIDE 10 MG/ML
INJECTION, SOLUTION INTRAVENOUS AS NEEDED
Status: DISCONTINUED | OUTPATIENT
Start: 2017-11-09 | End: 2017-11-09 | Stop reason: SURG

## 2017-11-09 RX ORDER — OXYCODONE HYDROCHLORIDE AND ACETAMINOPHEN 5; 325 MG/1; MG/1
1 TABLET ORAL EVERY 4 HOURS PRN
Status: CANCELLED | OUTPATIENT
Start: 2017-11-09

## 2017-11-09 RX ORDER — SODIUM CHLORIDE, SODIUM LACTATE, POTASSIUM CHLORIDE, CALCIUM CHLORIDE 600; 310; 30; 20 MG/100ML; MG/100ML; MG/100ML; MG/100ML
125 INJECTION, SOLUTION INTRAVENOUS CONTINUOUS
Status: DISCONTINUED | OUTPATIENT
Start: 2017-11-09 | End: 2017-11-09 | Stop reason: HOSPADM

## 2017-11-09 RX ORDER — OXYCODONE HYDROCHLORIDE AND ACETAMINOPHEN 5; 325 MG/1; MG/1
1 TABLET ORAL EVERY 4 HOURS PRN
Qty: 28 TABLET | Refills: 0 | Status: SHIPPED | OUTPATIENT
Start: 2017-11-09 | End: 2017-11-19

## 2017-11-09 RX ORDER — MIDAZOLAM HYDROCHLORIDE 1 MG/ML
INJECTION INTRAMUSCULAR; INTRAVENOUS AS NEEDED
Status: DISCONTINUED | OUTPATIENT
Start: 2017-11-09 | End: 2017-11-09 | Stop reason: SURG

## 2017-11-09 RX ORDER — FENTANYL CITRATE 50 UG/ML
INJECTION, SOLUTION INTRAMUSCULAR; INTRAVENOUS AS NEEDED
Status: DISCONTINUED | OUTPATIENT
Start: 2017-11-09 | End: 2017-11-09 | Stop reason: SURG

## 2017-11-09 RX ADMIN — CEFAZOLIN SODIUM 2000 MG: 2 SOLUTION INTRAVENOUS at 11:58

## 2017-11-09 RX ADMIN — ONDANSETRON 4 MG: 2 INJECTION INTRAMUSCULAR; INTRAVENOUS at 13:57

## 2017-11-09 RX ADMIN — ROCURONIUM BROMIDE 20 MG: 10 INJECTION INTRAVENOUS at 12:43

## 2017-11-09 RX ADMIN — SODIUM CHLORIDE 150 ML/HR: 0.45 INJECTION, SOLUTION INTRAVENOUS at 14:42

## 2017-11-09 RX ADMIN — ROCURONIUM BROMIDE 10 MG: 10 INJECTION INTRAVENOUS at 13:47

## 2017-11-09 RX ADMIN — OXYCODONE HYDROCHLORIDE AND ACETAMINOPHEN 1 TABLET: 5; 325 TABLET ORAL at 15:33

## 2017-11-09 RX ADMIN — DEXAMETHASONE SODIUM PHOSPHATE 10 MG: 10 INJECTION INTRAMUSCULAR; INTRAVENOUS at 11:57

## 2017-11-09 RX ADMIN — FENTANYL CITRATE 50 MCG: 50 INJECTION, SOLUTION INTRAMUSCULAR; INTRAVENOUS at 12:03

## 2017-11-09 RX ADMIN — LIDOCAINE HYDROCHLORIDE 40 MG: 10 INJECTION, SOLUTION INFILTRATION; PERINEURAL at 11:44

## 2017-11-09 RX ADMIN — FENTANYL CITRATE 50 MCG: 50 INJECTION, SOLUTION INTRAMUSCULAR; INTRAVENOUS at 12:00

## 2017-11-09 RX ADMIN — ROCURONIUM BROMIDE 30 MG: 10 INJECTION INTRAVENOUS at 11:44

## 2017-11-09 RX ADMIN — MIDAZOLAM HYDROCHLORIDE 2 MG: 1 INJECTION, SOLUTION INTRAMUSCULAR; INTRAVENOUS at 11:36

## 2017-11-09 RX ADMIN — IOHEXOL 15 ML: 240 INJECTION, SOLUTION INTRATHECAL; INTRAVASCULAR; INTRAVENOUS; ORAL at 13:45

## 2017-11-09 RX ADMIN — SODIUM CHLORIDE, SODIUM LACTATE, POTASSIUM CHLORIDE, AND CALCIUM CHLORIDE: .6; .31; .03; .02 INJECTION, SOLUTION INTRAVENOUS at 11:36

## 2017-11-09 RX ADMIN — ONDANSETRON 4 MG: 2 INJECTION INTRAMUSCULAR; INTRAVENOUS at 11:57

## 2017-11-09 RX ADMIN — SODIUM CHLORIDE, SODIUM LACTATE, POTASSIUM CHLORIDE, AND CALCIUM CHLORIDE 125 ML/HR: .6; .31; .03; .02 INJECTION, SOLUTION INTRAVENOUS at 10:10

## 2017-11-09 NOTE — DISCHARGE INSTRUCTIONS
Cistoscopia   LO QUE NECESITA SABER:   Mary Kate Quintero es un procedimiento para mirar dentro de la uretra y la vejiga usando un cistoscopio  Un cistoscopio es un tubo pequeño con Doerun Burger ginny y Lillian Mini con lente de aumento en el extremo  El procedimiento se [place] para diagnosticar y tratar condiciones de la vejiga, uretra y próstata  El procedimiento también se realiza para remover cálculos o coágulos de le de la uretra o la vejiga  Es posible que navarro médico realice otros exámenes, papa ericka ureteroscopía, sun ericka cistoscopía  INSTRUCCIONES SOBRE [facility]:   Llame al 911 si presenta:   · Usted tiene dolor de pecho o dificultad para respirar repentinos  Busque atención médica de inmediato si:   · Navarro orina se torna de color concepcion a macias o si usted tiene coágulos en navarro orina  · Usted no puede orinar y navarro vejiga se siente llena  · El dolor o el ardor se vuelve peor o dura más de 2 días  Comuníquese con navarro médico o urólogo si:   · Navarro orina permanece rosada por más de 3 días  · Usted orina menos de lo normal o todavía siente que tiene que orinar después de usar el baño  · Navarro piel tiene comezón, inflamación o tiene un sarpullido nuevo  · Usted tiene fiebre o escalofríos  · Usted tiene preguntas o inquietudes acerca de navarro condición o cuidado  Medicamentos:  A usted  podrían  darle alguno de lo siguientes:  · Antibióticos  ayudan a tratar o prevenir infecciones bacteriales  · Acetaminofeno:  dread el dolor y baja la fiebre  Está disponible sin receta médica  Pregunte la cantidad y la frecuencia con que debe tomarlos  [address]  [address] las etiquetas de todos los demás medicamentos que esté usando para saber si también contienen acetaminofén, o pregunte a navarro médico o farmacéutico  El acetaminofén puede causar daño en el hígado cuando no se leroy de forma correcta  No use más de 4 gramos (4000 miligramos) en total de acetaminofeno en un día       · [facility] medicamentos papa se le haya indicado  Consulte con navarro médico si usted estefania que navarro medicamento no le está ayudando o si presenta efectos secundarios  Infórmele si es alérgico a cualquier medicamento  Mantenga ericka lista actualizada de los [place], las vitaminas y los productos herbales que leroy  Incluya los siguientes datos de los medicamentos: cantidad, frecuencia y motivo de administración  Traiga con usted la lista o los envases de la píldoras a [place] citas de seguimiento  Lleve la lista de los medicamentos con usted en lilo de ericka emergencia  Acuda a ind[place] consultas de control con navarro médico según le indicaron  Es posible que usted necesite regresar para tener otra cistoscopía  Anote [place] preguntas para que se acuerde de hacerlas sun [place] visitas  Cuidados personales:   · [place] por lo menos 3 a 4 vasos de agua al día sun 2 días después de navarro procedimiento  No tome jugos ácidos papa jugo de [id number] agua para ayudar a prevenir la formación de coágulos sanguíneos  También puede ayudar a disminuir la cantidad de ácido en navarro orina  Es posible que ácido en navarro orina aumente la sensación de ardor cuando orina  · [place] en ericka lito con agua tibia  Es posible que el agua tibia alivie el dolor y los espasmos de la vejiga  · No tenga relaciones sexuales  hasta que navarro médico le diga que está [place]  Es posible que tener relaciones sexuales aumente el riesgo de ericka infección del tracto urinario  © 2017 [address] [employer] Information is for End User's use only and may not be sold, redistributed or otherwise used for commercial purposes  All illustrations and images included in CareNotes® are the copyrighted property of A D A M , Inc  or Iván Julio  Esta información es sólo para uso en educación  Navarro intención no es darle un consejo médico sobre enfermedades o tratamientos   Colsulte con navarro Ozell Fabry farmacéutico antes de seguir cualquier régimen [employer] para saber si es seguro y efectivo para usted  Cystoscopy   WHAT YOU NEED TO KNOW:   A cystoscopy is a procedure to look inside of your urethra and bladder using a cystoscope  A cystoscope is a small tube with a light and magnifying camera on the end  The procedure is used to diagnose and treat conditions of the bladder, urethra, and prostate  The procedure is also done to remove stones or blood clots from the urethra or bladder  Your healthcare provider may do other tests, such as ureteroscopy, during a cystoscopy  DISCHARGE INSTRUCTIONS:   Call 911 if:   · You suddenly have chest pain or trouble breathing  Seek care immediately if:   · Your urine turns from pink to red, or you have clots in your urine  · You cannot urinate and your bladder feels full  · Your pain or burning becomes worse or lasts longer than 2 days  Contact your healthcare provider or urologist if:   · Your urine stays pink for longer than 3 days  · You urinate less than normal, or still feel like you have to urinate after you use the bathroom  · Your skin is itchy, swollen, or has a new rash  · You have a fever and chills  · You have questions or concerns about your condition or care  Medicines: You may  be given any of the following:  · Antibiotics  help treat or prevent a bacterial infection  · Acetaminophen  decreases pain and fever  It is available without a doctor's order  Ask how much to take and how often to take it  Follow directions  Read the labels of all other medicines you are using to see if they also contain acetaminophen, or ask your doctor or pharmacist  Acetaminophen can cause liver damage if not taken correctly  Do not use more than 4 grams (4,000 milligrams) total of acetaminophen in one day  · Take your medicine as directed  Contact your healthcare provider if you think your medicine is not helping or if you have side effects  Tell him or her if you are allergic to any medicine   Keep a list of the medicines, vitamins, and herbs you take  Include the amounts, and when and why you take them  Bring the list or the pill bottles to follow-up visits  Carry your medicine list with you in case of an emergency  Follow up with your healthcare provider as directed: You may need to have another cystoscopy  Write down your questions so you remember to ask them during your visits  Self-care:   · Drink at least 3 to 4 glasses of water daily for 2 days after your procedure  Do not drink acidic juices such as orange juice and lemonade  Drink water to help prevent blood clots from forming  It can also help decrease the amount of acid in your urine  Acid in your urine may increase the burning feeling when you urinate  · Sit in a warm tub of water  Warm water may relieve pain and bladder spasms  · Do not have sex  until your healthcare provider tells you it is okay  Sex may increase your risk for a urinary tract infection  © 2017 2600 [facility] Information is for End User's use only and may not be sold, redistributed or otherwise used for commercial purposes  All illustrations and images included in CareNotes® are the copyrighted property of A D A M , Inc  or Iván Julio  The above information is an  only  It is not intended as medical advice for individual conditions or treatments  Talk to your doctor, nurse or pharmacist before following any medical regimen to see if it is safe and effective for you

## 2017-11-09 NOTE — ANESTHESIA POSTPROCEDURE EVALUATION
Post-Op Assessment Note      CV Status:  Stable    Mental Status:  Awake    Hydration Status:  Stable    PONV Controlled:  Controlled    Airway Patency:  Patent    Post Op Vitals Reviewed: Yes          Staff: Anesthesiologist, CRNA           BP   107/64   Temp (!) 97 3 °F (36 3 °C) (11/09/17 1410)    Pulse  81   Resp   18   SpO2   94

## 2017-11-09 NOTE — ANESTHESIA PREPROCEDURE EVALUATION
Review of Systems/Medical History  Patient summary reviewed  Chart reviewed  History of anesthetic complications PONV    Cardiovascular  Negative cardio ROS Exercise tolerance: good,     Pulmonary  Smoker cigar smoker , ,        GI/Hepatic  Negative GI/hepatic ROS          Kidney stones (s/p recent ESWL),        Endo/Other  Negative endo/other ROS      GYN       Hematology  Negative hematology ROS      Musculoskeletal  Negative musculoskeletal ROS        Neurology  Negative neurology ROS      Psychology   Negative psychology ROS            Physical Exam    Airway    Mallampati score: II  TM Distance: >3 FB  Neck ROM: full     Dental   No notable dental hx     Cardiovascular  Comment: Negative ROS,     Pulmonary      Other Findings        Anesthesia Plan  ASA Score- 2       Anesthesia Type- general with ASA Monitors  Additional Monitors:   Airway Plan: LMA  Comment: GA with LMA, IV, antiemetics  Induction- intravenous  Informed Consent- Anesthetic plan and risks discussed with patient  I personally reviewed this patient with the CRNA  Discussed and agreed on the Anesthesia Plan with the CRNA  Kush Tolbert

## 2017-11-09 NOTE — PROGRESS NOTES
Assumed care of patient at this time  Report received from Kerby Baumgarten in [address]  Patient in supine position with HOB elevated to 60 degrees  In NAD  Pt's wife Gordon Vanessa at bedside

## 2017-11-09 NOTE — OP NOTE
OPERATIVE REPORT  PATIENT NAME: Yahaira Moss    :  1980  MRN: [id number]  Pt Location: BE CYSTO ROOM 01    SURGERY DATE: 2017    Surgeon(s) and Role:     * Baldwin Baumgarten, MD - Primary    Preop Diagnosis:  Calculus of kidney [N20 0]  RIGHT Ureteral Steinstr[address]    Post-Op Diagnosis Codes:     * Calculus of kidney [N20 0]  same  Procedure(s) (LRB):  CYSTOSCOPY RETROGRADE PYELOGRAM WITH URETEROSCOPY WITH LASER AND RIGHT STENT EXCHANGE (Right)    Specimen(s):  ID Type Source Tests Collected by Time Destination   A :  Urine Urine, Cystoscopic URINE CULTURE Baldwin Baumgarten, MD 2017 1125    B :  Calculus Kidney, Right STONE ANALYSIS Baldwin Baumgarten, MD 2017 1344        Estimated Blood Loss:   Minimal    Drains:       Anesthesia Type:   General    Operative Indications:  Calculus of kidney [N20 0]  Steinstrasse right ureter    Operative Findings:  Calculus in UPJ 8mm, multiple calculii in the right ureter    Complications:   None    Procedure and Technique: This patient had previous right renal lithotripsy for a large pelvic calculus  Subsequently he has developed Steinstrasse and a fragment at the UPJ junction  He is brought to the operating room identified transferred to the OR table  General anesthesia was then administered  He was placed in lithotomy position  The genitalia were prepped Betadine and he was draped  Confirmatory time-out was then performed  Cystoscopy was then performed  A stent was seen protruding from the right orifice it was grasped and removed  Retrograde study showed obstruction of the ureter  A safety wire was then passed into the ureter to the level of the renal pelvis  At this time the ureteral scope could be passed up into the ureter  Multiple stone fragments were encountered  Using a long tedious process of laser lithotripsy and extraction of some of the stone with a basket  Ultimately I could clean out the entire ureter    The stone at the UPJ was grasped with a basket  It was then brought and secured at the UPJ were the laser fiber was used to fragment and remove this stone as well  After multiple visualizations of the ureter it was elected to discontinue was no other stone debris other than very small fragments could be seen  The instruments were removed  Safety wire was backloaded over the cystoscope  A 6 x 28 cm nephroureteral stent was then inserted without difficulty  He tolerated this well was transferred to PACU in good condition     I was present for the entire procedure    Patient Disposition:  PACU     SIGNATURE: Reyna Castillo MD  DATE: November 9, 2017  TIME: 2:06 PM

## 2017-11-11 LAB — BACTERIA UR CULT: NORMAL

## 2017-11-18 ENCOUNTER — HOSPITAL ENCOUNTER (OUTPATIENT)
Dept: RADIOLOGY | Facility: HOSPITAL | Age: 37
Discharge: HOME | End: 2017-11-18
Attending: UROLOGY
Payer: COMMERCIAL

## 2017-11-18 DIAGNOSIS — N20.0 URIC ACID NEPHROLITHIASIS: ICD-10-CM

## 2017-11-18 PROCEDURE — 74000 HB X-RAY EXAM OF ABDOMEN (SINGLE ANTEROPOSTERIOR VIEW): CPT

## 2017-11-22 LAB
CA PHOS MFR STONE: 10 %
CALCIUM OXALATE DIHYDRATE MFR STONE IR: 5 %
COLOR STONE: NORMAL
COM MFR STONE: 85 %
COMMENT-STONE3: NORMAL
COMPOSITION: NORMAL
LABORATORY COMMENT REPORT: NORMAL
NIDUS STONE QL: NORMAL
PHOTO: NORMAL
SIZE STONE: NORMAL MM
STONE ANALYSIS-IMP: NORMAL
WT STONE: 7 MG

## 2017-12-01 ENCOUNTER — TRANSCRIBE ORDERS (OUTPATIENT)
Dept: ADMINISTRATIVE | Facility: HOSPITAL | Age: 37
End: 2017-12-01

## 2017-12-01 DIAGNOSIS — N20.1 CALCULUS OF URETER: Primary | ICD-10-CM

## 2017-12-29 ENCOUNTER — HOSPITAL ENCOUNTER (OUTPATIENT)
Dept: RADIOLOGY | Facility: HOSPITAL | Age: 37
Discharge: HOME | End: 2017-12-29
Attending: UROLOGY
Payer: COMMERCIAL

## 2017-12-29 ENCOUNTER — GENERIC CONVERSION - ENCOUNTER (OUTPATIENT)
Dept: OTHER | Facility: OTHER | Age: 37
End: 2017-12-29

## 2017-12-29 DIAGNOSIS — N20.1 CALCULUS OF URETER: ICD-10-CM

## 2017-12-29 PROCEDURE — 76770 US EXAM ABDO BACK WALL COMP: CPT

## 2018-01-02 ENCOUNTER — GENERIC CONVERSION - ENCOUNTER (OUTPATIENT)
Dept: OTHER | Facility: OTHER | Age: 38
End: 2018-01-02

## 2018-01-04 ENCOUNTER — HOSPITAL ENCOUNTER (OUTPATIENT)
Facility: HOSPITAL | Age: 38
Setting detail: OUTPATIENT SURGERY
Discharge: HOME | End: 2018-01-04
Attending: UROLOGY | Admitting: UROLOGY
Payer: COMMERCIAL

## 2018-01-04 ENCOUNTER — ANESTHESIA EVENT (OUTPATIENT)
Dept: PERIOP | Facility: HOSPITAL | Age: 38
End: 2018-01-04
Payer: COMMERCIAL

## 2018-01-04 ENCOUNTER — APPOINTMENT (OUTPATIENT)
Dept: RADIOLOGY | Facility: HOSPITAL | Age: 38
End: 2018-01-04
Payer: COMMERCIAL

## 2018-01-04 ENCOUNTER — ANESTHESIA (OUTPATIENT)
Dept: PERIOP | Facility: HOSPITAL | Age: 38
End: 2018-01-04
Payer: COMMERCIAL

## 2018-01-04 VITALS
OXYGEN SATURATION: 94 % | RESPIRATION RATE: 16 BRPM | WEIGHT: 204 LBS | TEMPERATURE: 98.3 F | BODY MASS INDEX: 30.92 KG/M2 | DIASTOLIC BLOOD PRESSURE: 81 MMHG | SYSTOLIC BLOOD PRESSURE: 143 MMHG | HEART RATE: 74 BPM | HEIGHT: 68 IN

## 2018-01-04 DIAGNOSIS — N13.30 HYDRONEPHROSIS: ICD-10-CM

## 2018-01-04 PROCEDURE — C2617 STENT, NON-COR, TEM W/O DEL: HCPCS | Performed by: UROLOGY

## 2018-01-04 PROCEDURE — 74420 UROGRAPHY RTRGR +-KUB: CPT

## 2018-01-04 PROCEDURE — C1726 CATH, BAL DIL, NON-VASCULAR: HCPCS | Performed by: UROLOGY

## 2018-01-04 PROCEDURE — 87086 URINE CULTURE/COLONY COUNT: CPT | Performed by: UROLOGY

## 2018-01-04 PROCEDURE — C1769 GUIDE WIRE: HCPCS | Performed by: UROLOGY

## 2018-01-04 DEVICE — STENT URETERAL 4.7FR 26CM INLAY OPTIMA: Type: IMPLANTABLE DEVICE | Status: FUNCTIONAL

## 2018-01-04 RX ORDER — TRAMADOL HYDROCHLORIDE 50 MG/1
50 TABLET ORAL EVERY 4 HOURS PRN
Qty: 18 TABLET | Refills: 0 | Status: SHIPPED | OUTPATIENT
Start: 2018-01-04 | End: 2018-01-09

## 2018-01-04 RX ORDER — FENTANYL CITRATE/PF 50 MCG/ML
25 SYRINGE (ML) INJECTION
Status: DISCONTINUED | OUTPATIENT
Start: 2018-01-04 | End: 2018-01-04 | Stop reason: HOSPADM

## 2018-01-04 RX ORDER — PROPOFOL 10 MG/ML
INJECTION, EMULSION INTRAVENOUS AS NEEDED
Status: DISCONTINUED | OUTPATIENT
Start: 2018-01-04 | End: 2018-01-04 | Stop reason: SURG

## 2018-01-04 RX ORDER — GLYCOPYRROLATE 0.2 MG/ML
INJECTION INTRAMUSCULAR; INTRAVENOUS AS NEEDED
Status: DISCONTINUED | OUTPATIENT
Start: 2018-01-04 | End: 2018-01-04 | Stop reason: SURG

## 2018-01-04 RX ORDER — ONDANSETRON 2 MG/ML
INJECTION INTRAMUSCULAR; INTRAVENOUS AS NEEDED
Status: DISCONTINUED | OUTPATIENT
Start: 2018-01-04 | End: 2018-01-04 | Stop reason: SURG

## 2018-01-04 RX ORDER — FENTANYL CITRATE 50 UG/ML
INJECTION, SOLUTION INTRAMUSCULAR; INTRAVENOUS AS NEEDED
Status: DISCONTINUED | OUTPATIENT
Start: 2018-01-04 | End: 2018-01-04 | Stop reason: SURG

## 2018-01-04 RX ORDER — SODIUM CHLORIDE, SODIUM LACTATE, POTASSIUM CHLORIDE, CALCIUM CHLORIDE 600; 310; 30; 20 MG/100ML; MG/100ML; MG/100ML; MG/100ML
150 INJECTION, SOLUTION INTRAVENOUS CONTINUOUS
Status: DISCONTINUED | OUTPATIENT
Start: 2018-01-04 | End: 2018-01-04 | Stop reason: HOSPADM

## 2018-01-04 RX ORDER — SODIUM CHLORIDE, SODIUM LACTATE, POTASSIUM CHLORIDE, CALCIUM CHLORIDE 600; 310; 30; 20 MG/100ML; MG/100ML; MG/100ML; MG/100ML
20 INJECTION, SOLUTION INTRAVENOUS CONTINUOUS
Status: DISCONTINUED | OUTPATIENT
Start: 2018-01-04 | End: 2018-01-04 | Stop reason: HOSPADM

## 2018-01-04 RX ORDER — MIDAZOLAM HYDROCHLORIDE 1 MG/ML
INJECTION INTRAMUSCULAR; INTRAVENOUS AS NEEDED
Status: DISCONTINUED | OUTPATIENT
Start: 2018-01-04 | End: 2018-01-04 | Stop reason: SURG

## 2018-01-04 RX ORDER — ONDANSETRON 2 MG/ML
4 INJECTION INTRAMUSCULAR; INTRAVENOUS EVERY 6 HOURS PRN
Status: DISCONTINUED | OUTPATIENT
Start: 2018-01-04 | End: 2018-01-04 | Stop reason: HOSPADM

## 2018-01-04 RX ORDER — METOCLOPRAMIDE HYDROCHLORIDE 5 MG/ML
10 INJECTION INTRAMUSCULAR; INTRAVENOUS ONCE AS NEEDED
Status: DISCONTINUED | OUTPATIENT
Start: 2018-01-04 | End: 2018-01-04 | Stop reason: HOSPADM

## 2018-01-04 RX ORDER — ROCURONIUM BROMIDE 10 MG/ML
INJECTION, SOLUTION INTRAVENOUS AS NEEDED
Status: DISCONTINUED | OUTPATIENT
Start: 2018-01-04 | End: 2018-01-04 | Stop reason: SURG

## 2018-01-04 RX ORDER — MEPERIDINE HYDROCHLORIDE 25 MG/ML
25 INJECTION INTRAMUSCULAR; INTRAVENOUS; SUBCUTANEOUS AS NEEDED
Status: DISCONTINUED | OUTPATIENT
Start: 2018-01-04 | End: 2018-01-04 | Stop reason: HOSPADM

## 2018-01-04 RX ORDER — OXYCODONE HYDROCHLORIDE AND ACETAMINOPHEN 5; 325 MG/1; MG/1
1 TABLET ORAL EVERY 4 HOURS PRN
Status: DISCONTINUED | OUTPATIENT
Start: 2018-01-04 | End: 2018-01-04 | Stop reason: HOSPADM

## 2018-01-04 RX ORDER — MAGNESIUM HYDROXIDE 1200 MG/15ML
LIQUID ORAL AS NEEDED
Status: DISCONTINUED | OUTPATIENT
Start: 2018-01-04 | End: 2018-01-04 | Stop reason: HOSPADM

## 2018-01-04 RX ADMIN — CEFAZOLIN SODIUM 2000 MG: 2 SOLUTION INTRAVENOUS at 13:04

## 2018-01-04 RX ADMIN — ROCURONIUM BROMIDE 50 MG: 10 INJECTION INTRAVENOUS at 13:07

## 2018-01-04 RX ADMIN — ONDANSETRON 4 MG: 2 INJECTION INTRAMUSCULAR; INTRAVENOUS at 13:32

## 2018-01-04 RX ADMIN — MIDAZOLAM HYDROCHLORIDE 2 MG: 1 INJECTION, SOLUTION INTRAMUSCULAR; INTRAVENOUS at 13:02

## 2018-01-04 RX ADMIN — NEOSTIGMINE METHYLSULFATE 2 MG: 1 INJECTION, SOLUTION INTRAMUSCULAR; INTRAVENOUS; SUBCUTANEOUS at 13:45

## 2018-01-04 RX ADMIN — LIDOCAINE HYDROCHLORIDE 5 ML: 20 INJECTION, SOLUTION INTRAVENOUS at 13:06

## 2018-01-04 RX ADMIN — SODIUM CHLORIDE, SODIUM LACTATE, POTASSIUM CHLORIDE, AND CALCIUM CHLORIDE 20 ML/HR: .6; .31; .03; .02 INJECTION, SOLUTION INTRAVENOUS at 12:48

## 2018-01-04 RX ADMIN — PROPOFOL 200 MG: 10 INJECTION, EMULSION INTRAVENOUS at 13:06

## 2018-01-04 RX ADMIN — GLYCOPYRROLATE 0.4 MG: 0.2 INJECTION, SOLUTION INTRAMUSCULAR; INTRAVENOUS at 13:45

## 2018-01-04 RX ADMIN — NEOSTIGMINE METHYLSULFATE 1 MG: 1 INJECTION, SOLUTION INTRAMUSCULAR; INTRAVENOUS; SUBCUTANEOUS at 13:48

## 2018-01-04 RX ADMIN — GLYCOPYRROLATE 0.2 MG: 0.2 INJECTION, SOLUTION INTRAMUSCULAR; INTRAVENOUS at 13:48

## 2018-01-04 RX ADMIN — FENTANYL CITRATE 50 MCG: 50 INJECTION, SOLUTION INTRAMUSCULAR; INTRAVENOUS at 13:06

## 2018-01-04 RX ADMIN — DEXAMETHASONE SODIUM PHOSPHATE 10 MG: 10 INJECTION INTRAMUSCULAR; INTRAVENOUS at 13:07

## 2018-01-04 NOTE — ANESTHESIA PREPROCEDURE EVALUATION
Review of Systems/Medical History  Patient summary reviewed  Chart reviewed  History of anesthetic complications PONV    Cardiovascular  Negative cardio ROS    Pulmonary  Smoker cigar smoker , ,   Comment: Occasional cigar  Recent URI with non productive cough, no sore throat, afebrile today     GI/Hepatic  Negative GI/hepatic ROS          Kidney stones,        Endo/Other     GYN       Hematology  Negative hematology ROS      Musculoskeletal  Negative musculoskeletal ROS        Neurology  Negative neurology ROS      Psychology   Negative psychology ROS            Physical Exam    Airway    Mallampati score: I  TM Distance: >3 FB  Neck ROM: full     Dental   No notable dental hx     Cardiovascular  Comment: Negative ROS,     Pulmonary      Other Findings        Anesthesia Plan  ASA Score- 2     Anesthesia Type- general with ASA Monitors  Additional Monitors:   Airway Plan: ETT  Comment: ETT per surg request      Plan Factors-  Patient did not smoke on day of surgery  Induction- intravenous  Postoperative Plan-     Informed Consent- Anesthetic plan and risks discussed with patient

## 2018-01-04 NOTE — DISCHARGE INSTRUCTIONS

## 2018-01-04 NOTE — ANESTHESIA POSTPROCEDURE EVALUATION
Post-Op Assessment Note      CV Status:  Stable    Mental Status:  Alert and awake    Hydration Status:  Euvolemic    PONV Controlled:  Controlled    Airway Patency:  Patent    Post Op Vitals Reviewed: Yes          Staff: Anesthesiologist           BP (P) 134/88 (01/04/18 1354)    Temp (P) 97 6 °F (36 4 °C) (01/04/18 1354)    Pulse (P) 96 (01/04/18 1354)   Resp (P) 18 (01/04/18 1354)    SpO2   98

## 2018-01-04 NOTE — OP NOTE
OPERATIVE REPORT  PATIENT NAME: Monica Aburto    :  1980  MRN: [id number]  Pt Location: BE CYSTO ROOM 01    SURGERY DATE: 2018    Surgeon(s) and Role:     * Reji Foster MD - Primary    Preop Diagnosis:  Hydronephrosis [N13 30]    Post-Op Diagnosis Codes: * Hydronephrosis [N13 30]    Procedure(s) (LRB):  CYSTOSCOPY, BILATERAL RETROGRADE PYELOGRAM WITH RIGHT URETEROSCOPY WITH HYDRODILATION OF STRICTURE , RIGHT STENT INSERTION X 2 (N/A)    Specimen(s):  ID Type Source Tests Collected by Time Destination   A :  Urine Urine, Cystoscopic URINE CULTURE Reji Foster MD 2018 1312        Estimated Blood Loss:   Minimal    Drains:       Anesthesia Type:   General    Operative Indications:  Hydronephrosis [N13 30]  right    Operative Findings:  Mid ureteral stricture - right    Complications:   None    Procedure and Technique: This patient presents for management of a right hydronephrosis  He has had multiple procedures for ureteral calculi  Follow-up ultrasound revealed hydronephrosis  Now for definitive management  Patient brought to operating room identified and transferred to the OR table  General anesthesia was then administered  After adequate anesthesia, the patient was placed in lithotomy position  His genitalia were prepped with Betadine and he was draped  A confirmatory time-out was then performed  Instrumentation and follow-up  Cystoscopy showed the urethra to be unremarkable  The prostate showed no occlusion there was minimal lateral lobe hypertrophy  The bladder was entered  There was no trabeculation  There was no stone  There was no tumor  The orifices were unremarkable  Retrograde study was then performed on the right side  The distal ureter was normal   There appeared to be a stricture at about the level of L4  I could easily pass the ureteral catheter above the strictured area  The proximal ureter was dilated but no other stricture was seen    The ureteral scope could then be passed after safety wire was positioned  The ureteroscope could then be passed to the renal pelvis  The strictured area again was noted and this offered minimal resistance  The ureteral scope was removed after no stone was seen  A hydrostatic balloon was then placed at this level of the stricture  The balloon was then inflated to 14 atmospheres pressure  This dilated the stricture and after several minutes the balloon was removed  The ureteral scope was again passed up to this level  It could easily go up to the kidney without difficulty  Two safety wires were allowed to remain in position  At this time 2 stents were inserted for dilatation of the ureter  A 4 7 [place] by 26 cm stent was placed alongside a 2nd 4 7 [place] by 26 cm stent  He tolerated this well  There was no difficulty with placement of the stents  He was awakened without incident  He was transferred to PACU in good condition  The stents will remain in position for approximately 12 weeks     I was present for the entire procedure    Patient Disposition:  PACU     SIGNATURE: Nu Ching MD  DATE: January 4, 2018  TIME: 2:05 PM

## 2018-01-06 LAB — BACTERIA UR CULT: NORMAL

## 2018-01-23 NOTE — RESULT NOTES
Verified Results  [address] [id number] 05:55PM Reji Desire     Test Name Result Flag Reference   US KIDNEY AND BLADDER (Report)     RENAL ULTRASOUND     INDICATION: 21-year-old female with a history renal stones  COMPARISON: CT May 19, 2017  TECHNIQUE:  Ultrasound of the retroperitoneum was performed with a curvilinear transducer utilizing volumetric sweeps and still imaging techniques  FINDINGS:     KIDNEYS:   Symmetric and normal size  Right kidney: 13 9 x 7 6 cm with cortical thickness 1 4 cm  Normal echogenicity and contour  No suspicious masses detected  Possible tiny renal cyst within the interpolar region on image 5 that does not demonstrate internal flow, measuring approximately 5 mm x 5 mm   No shadowing calculi  No perinephric fluid collections  Limited renal Doppler imaging is obtained demonstrating normal resistive indices and normal appearing waveforms  Left kidney: 11 6 x 6 7 cm with cortical thickness 1 5 cm  Normal echogenicity and contour  No suspicious masses detected  No hydronephrosis  No shadowing calculi  No perinephric fluid collections  Limited renal Doppler imaging is obtained demonstrating normal resistive indices  An inappropriately measured resistive index is noted within the upper pole, this is a technical issue only  URETERS:   Right ureter is visualized throughout its mid length without focal abnormality  BLADDER:    Normally distended  No focal thickening or mass lesions  Bilateral ureteral jets are not identified  IMPRESSION:     Severe hydronephrosis of the right kidney without visualized evidence of obstructing stone  Additionally, there is dilatation of the right ureter to the mid segment without focal amount he  Patient had a right nephroureteral stent on November 18, 2017,    this is not identified on today's exam      Possible 5 mm right renal cyst      The ureteral jets are not identified  Workstation performed: [id number]     Signed by:   Carla Johnson MD   12/31/17

## 2018-01-26 ENCOUNTER — HOSPITAL ENCOUNTER (OUTPATIENT)
Dept: RADIOLOGY | Facility: HOSPITAL | Age: 38
Discharge: HOME | End: 2018-01-26
Attending: UROLOGY
Payer: COMMERCIAL

## 2018-01-26 DIAGNOSIS — N13.30 HYDRONEPHROSIS: ICD-10-CM

## 2018-01-26 PROCEDURE — 76770 US EXAM ABDO BACK WALL COMP: CPT

## 2018-02-02 ENCOUNTER — OFFICE VISIT (OUTPATIENT)
Dept: UROLOGY | Facility: CLINIC | Age: 38
End: 2018-02-02
Payer: COMMERCIAL

## 2018-02-02 VITALS
BODY MASS INDEX: 30.62 KG/M2 | HEIGHT: 68 IN | SYSTOLIC BLOOD PRESSURE: 126 MMHG | WEIGHT: 202 LBS | DIASTOLIC BLOOD PRESSURE: 70 MMHG

## 2018-02-02 DIAGNOSIS — N20.0 CALCULUS OF KIDNEY: ICD-10-CM

## 2018-02-02 DIAGNOSIS — N20.1 CALCULUS OF URETER: Primary | ICD-10-CM

## 2018-02-02 LAB
PROT UR STRIP-MCNC: NEGATIVE MG/DL
SL AMB  POCT GLUCOSE, UA: ABNORMAL
SL AMB LEUKOCYTE ESTERASE,UA: ABNORMAL
SL AMB POCT BILIRUBIN,UA: ABNORMAL
SL AMB POCT BLOOD,UA: ABNORMAL
SL AMB POCT CLARITY,UA: CLEAR
SL AMB POCT COLOR,UA: YELLOW
SL AMB POCT KETONES,UA: ABNORMAL
SL AMB POCT NITRITE,UA: ABNORMAL
SL AMB POCT PH,UA: 5.5
SL AMB POCT SPECIFIC GRAVITY,UA: ABNORMAL

## 2018-02-02 PROCEDURE — 99213 OFFICE O/P EST LOW 20 MIN: CPT | Performed by: UROLOGY

## 2018-02-02 PROCEDURE — 81000 URINALYSIS NONAUTO W/SCOPE: CPT | Performed by: UROLOGY

## 2018-02-02 NOTE — PROGRESS NOTES
Assessment/Plan:     Follow-up history of renal lithiasis followed by ureteral calculi and last followed by a ureteral stricture which was treated by balloon dilatation  Will plan to recheck him in 1 month to remove the stent  I will get a KUB prior to that visit   Diagnoses and all orders for this visit:    Calculus of ureter  -     POCT urine dip  -     XR abdomen 1 view kub; Future    Calculus of kidney          Subjective:     Patient ID: Rex Bailon is a 44 y o  male  HPI    patient feeling well at this time  He does have occasional symptoms from the right ureteral stent  There are no fever chills or bleeding  Review of Systems      Objective:     Physical Exam        No CVA tenderness noted    The abdomen is soft

## 2018-02-23 ENCOUNTER — HOSPITAL ENCOUNTER (OUTPATIENT)
Dept: RADIOLOGY | Facility: HOSPITAL | Age: 38
Discharge: HOME | End: 2018-02-23
Attending: UROLOGY
Payer: COMMERCIAL

## 2018-02-23 ENCOUNTER — TRANSCRIBE ORDERS (OUTPATIENT)
Dept: RADIOLOGY | Facility: HOSPITAL | Age: 38
End: 2018-02-23

## 2018-02-23 DIAGNOSIS — N20.1 CALCULUS OF URETER: ICD-10-CM

## 2018-02-23 PROCEDURE — 74018 RADEX ABDOMEN 1 VIEW: CPT

## 2018-03-02 ENCOUNTER — PROCEDURE VISIT (OUTPATIENT)
Dept: UROLOGY | Facility: CLINIC | Age: 38
End: 2018-03-02
Payer: COMMERCIAL

## 2018-03-02 VITALS
BODY MASS INDEX: 30.31 KG/M2 | DIASTOLIC BLOOD PRESSURE: 70 MMHG | HEIGHT: 68 IN | WEIGHT: 200 LBS | SYSTOLIC BLOOD PRESSURE: 126 MMHG

## 2018-03-02 DIAGNOSIS — N20.1 CALCULUS OF URETER: Primary | ICD-10-CM

## 2018-03-02 LAB
SL AMB  POCT GLUCOSE, UA: NORMAL
SL AMB LEUKOCYTE ESTERASE,UA: NORMAL
SL AMB POCT BILIRUBIN,UA: NORMAL
SL AMB POCT BLOOD,UA: NORMAL
SL AMB POCT CLARITY,UA: CLEAR
SL AMB POCT COLOR,UA: YELLOW
SL AMB POCT KETONES,UA: NORMAL
SL AMB POCT NITRITE,UA: NORMAL
SL AMB POCT PH,UA: 5
SL AMB POCT SPECIFIC GRAVITY,UA: NORMAL
SL AMB POCT URINE PROTEIN: NORMAL
SL AMB POCT UROBILINOGEN: NORMAL

## 2018-03-02 PROCEDURE — 87086 URINE CULTURE/COLONY COUNT: CPT | Performed by: UROLOGY

## 2018-03-02 PROCEDURE — 81002 URINALYSIS NONAUTO W/O SCOPE: CPT | Performed by: UROLOGY

## 2018-03-02 PROCEDURE — 52310 CYSTOSCOPY AND TREATMENT: CPT | Performed by: UROLOGY

## 2018-03-02 RX ORDER — CIPROFLOXACIN 500 MG/1
500 TABLET, FILM COATED ORAL 2 TIMES DAILY
Qty: 6 TABLET | Refills: 0 | Status: SHIPPED | OUTPATIENT
Start: 2018-03-02 | End: 2018-03-05

## 2018-03-02 NOTE — PROGRESS NOTES
Cystoscopy    Patient: Durga Putnam                       :    1980                                    Date:    3/2/2018    Surgeon:  Yanelis Small MD    Preoperative Diagnosis:  Retained right nephroureteral stent x2    Postoperative Diagnosis:  Same status post ureteral lithotripsy    Anesthesia: 2% lidocaine gel    Operative Procedure: Cystoscopy  Complications: None  Procedure: The patient was ID on the table  The penis was prepped and draped in sterile fashion  2% Local Lidocaine was placed  Five minutes passed before inspection of the bladder  A 14 fr flexible cystoscope was passed per meatus  The urethra was unremarkable  There is no evidence of stricture  The prostate showing minimal hypertrophy  The bladder is entered  I do not appreciate any stone in the bladder  Bladder is otherwise unremarkable  The stents protruding from the right ureteral orifice are grasped and removed without incident  Scope was removed  Patient tolerated the procedure well and was given antibiotics prophylactically  Patient instructed to call fever chills or intractable pain  I will check a renal ultrasound in approximately 2 weeks    Office visit follow-up in 3 weeks for urinalysis and BP check

## 2018-03-03 LAB — BACTERIA UR CULT: NORMAL

## 2018-03-13 ENCOUNTER — HOSPITAL ENCOUNTER (OUTPATIENT)
Dept: RADIOLOGY | Facility: HOSPITAL | Age: 38
Discharge: HOME | End: 2018-03-13
Attending: UROLOGY
Payer: COMMERCIAL

## 2018-03-13 DIAGNOSIS — N20.1 CALCULUS OF URETER: ICD-10-CM

## 2018-03-13 PROCEDURE — 76770 US EXAM ABDO BACK WALL COMP: CPT

## 2018-03-14 ENCOUNTER — TELEPHONE (OUTPATIENT)
Dept: UROLOGY | Facility: CLINIC | Age: 38
End: 2018-03-14

## 2018-03-14 NOTE — TELEPHONE ENCOUNTER
I spoke to the patient's wife regarding the ultrasound  The dilatation of the upper right renal unit is increased from the stent film  He was having some flank discomfort which she describes as not severe  There is no fever or chills  There is no hematuria  We will see him in 1 week for follow-up  I would prefer to stay conservative at this time  Follow-up with ultrasound in approximately 1 month  However, if he has increasing pain, nausea or vomiting or fever chills he may require intervention

## 2018-03-23 ENCOUNTER — OFFICE VISIT (OUTPATIENT)
Dept: UROLOGY | Facility: CLINIC | Age: 38
End: 2018-03-23
Payer: COMMERCIAL

## 2018-03-23 VITALS
WEIGHT: 202 LBS | HEIGHT: 68 IN | DIASTOLIC BLOOD PRESSURE: 64 MMHG | BODY MASS INDEX: 30.62 KG/M2 | SYSTOLIC BLOOD PRESSURE: 122 MMHG

## 2018-03-23 DIAGNOSIS — N20.1 CALCULUS OF URETER: Primary | ICD-10-CM

## 2018-03-23 DIAGNOSIS — N20.0 CALCULUS OF KIDNEY: ICD-10-CM

## 2018-03-23 PROCEDURE — 81002 URINALYSIS NONAUTO W/O SCOPE: CPT | Performed by: PHYSICIAN ASSISTANT

## 2018-03-23 PROCEDURE — 99213 OFFICE O/P EST LOW 20 MIN: CPT | Performed by: PHYSICIAN ASSISTANT

## 2018-03-23 RX ORDER — OXYCODONE HYDROCHLORIDE AND ACETAMINOPHEN 5; 325 MG/1; MG/1
1 TABLET ORAL EVERY 4 HOURS PRN
COMMUNITY
End: 2018-05-11 | Stop reason: ALTCHOICE

## 2018-03-23 NOTE — PROGRESS NOTES
UROLOGY PROGRESS NOTE   Patient Identifiers: Monica Aburto (MRN [id number])  Date of Service: 3/23/2018    Subjective:   44 yeqar old male with significant stone disease on the right  He had several procedures over the last 10 months or so  Urine is clear  His renal ultrasound shows worsening hydro on the  Right side  No stones seen  Patient has  complaints of occasional discomofort on the right  Objective:     VITALS:    Vitals:    03/23/18 0941   BP: 122/64           LABS:  Lab Results   Component Value Date    HGB 16 2 10/27/2017    HCT 46 5 10/27/2017    WBC 7 57 10/27/2017     10/27/2017   ]    Lab Results   Component Value Date     10/27/2017    K 4 2 10/27/2017     10/27/2017    CO2 29 10/27/2017    BUN 15 10/27/2017    CREATININE 1 03 10/27/2017    CALCIUM 8 7 10/27/2017    GLUCOSE 84 10/27/2017   ]    INPATIENT MEDS:    Current Outpatient Prescriptions:     oxyCODONE-acetaminophen (PERCOCET) 5-325 mg per tablet, Take 1 tablet by mouth every 4 (four) hours as needed for moderate pain, Disp: , Rfl:       Physical Exam:   /64 (BP Location: Right arm, Patient Position: Sitting, Cuff Size: Adult)   Ht 5' 7 5" (1 715 m)   Wt 91 6 kg (202 lb)   BMI 31 17 kg/m²   GEN: no acute distress    RESP: breathing comfortably with no accessory muscle use    ABD: soft, non-tender, non-distended   INCISION:    EXT: no significant peripheral edema     RADIOLOGY:    RENAL ULTRASOUND  1  Worsening right hydronephrosis status post removal of stent, now moderate  Dilatation of the right ureter also seen  2   Normal left kidney  3   Bilateral ureteral jets detected  No significant post void residual urine volume  Assessment:   1  Right hydronephrosis  2   History of kidney stones                                                                                                                      Plan:   - discussed with Dr Karri Stovall  - will recheck an ultrasound in 4 weeks and get labs   -  -  -

## 2018-04-23 ENCOUNTER — HOSPITAL ENCOUNTER (OUTPATIENT)
Dept: RADIOLOGY | Facility: HOSPITAL | Age: 38
Discharge: HOME | End: 2018-04-23
Payer: COMMERCIAL

## 2018-04-23 DIAGNOSIS — N20.0 CALCULUS OF KIDNEY: ICD-10-CM

## 2018-04-23 PROCEDURE — 76770 US EXAM ABDO BACK WALL COMP: CPT

## 2018-05-05 ENCOUNTER — APPOINTMENT (OUTPATIENT)
Dept: LAB | Facility: HOSPITAL | Age: 38
End: 2018-05-05
Payer: COMMERCIAL

## 2018-05-05 DIAGNOSIS — N20.0 CALCULUS OF KIDNEY: ICD-10-CM

## 2018-05-05 LAB
ANION GAP SERPL CALCULATED.3IONS-SCNC: 5 MMOL/L (ref 4–13)
BUN SERPL-MCNC: 16 MG/DL (ref 5–25)
CALCIUM SERPL-MCNC: 9 MG/DL (ref 8.3–10.1)
CHLORIDE SERPL-SCNC: 105 MMOL/L (ref 100–108)
CO2 SERPL-SCNC: 27 MMOL/L (ref 21–32)
CREAT SERPL-MCNC: 1.11 MG/DL (ref 0.6–1.3)
GFR SERPL CREATININE-BSD FRML MDRD: 84 ML/MIN/1.73SQ M
GLUCOSE P FAST SERPL-MCNC: 87 MG/DL (ref 65–99)
POTASSIUM SERPL-SCNC: 4.1 MMOL/L (ref 3.5–5.3)
SODIUM SERPL-SCNC: 137 MMOL/L (ref 136–145)

## 2018-05-05 PROCEDURE — 36415 COLL VENOUS BLD VENIPUNCTURE: CPT

## 2018-05-05 PROCEDURE — 80048 BASIC METABOLIC PNL TOTAL CA: CPT

## 2018-05-11 ENCOUNTER — OFFICE VISIT (OUTPATIENT)
Dept: UROLOGY | Facility: CLINIC | Age: 38
End: 2018-05-11
Payer: COMMERCIAL

## 2018-05-11 VITALS
HEIGHT: 68 IN | DIASTOLIC BLOOD PRESSURE: 62 MMHG | BODY MASS INDEX: 31.67 KG/M2 | WEIGHT: 209 LBS | SYSTOLIC BLOOD PRESSURE: 118 MMHG

## 2018-05-11 DIAGNOSIS — N20.1 CALCULUS OF URETER: Primary | ICD-10-CM

## 2018-05-11 DIAGNOSIS — N20.0 CALCULUS OF KIDNEY: ICD-10-CM

## 2018-05-11 PROCEDURE — 81002 URINALYSIS NONAUTO W/O SCOPE: CPT | Performed by: UROLOGY

## 2018-05-11 PROCEDURE — 99213 OFFICE O/P EST LOW 20 MIN: CPT | Performed by: UROLOGY

## 2018-05-11 RX ORDER — TAMSULOSIN HYDROCHLORIDE 0.4 MG/1
0.4 CAPSULE ORAL
Qty: 15 CAPSULE | Refills: 0 | Status: SHIPPED | OUTPATIENT
Start: 2018-05-11 | End: 2018-08-02

## 2018-05-11 RX ORDER — OXYCODONE HYDROCHLORIDE AND ACETAMINOPHEN 5; 325 MG/1; MG/1
1 TABLET ORAL EVERY 4 HOURS PRN
Qty: 15 TABLET | Refills: 0 | Status: SHIPPED | OUTPATIENT
Start: 2018-05-11 | End: 2018-08-02

## 2018-05-11 NOTE — PROGRESS NOTES
UROLOGY PROGRESS NOTE   Patient Identifiers: Andria Mcbride (MRN [id number])  Date of Service: 5/11/2018    Subjective:    44year old male with a fairly extensive history of renal lithiasis on the right side  He had a stent removed and his previous ultrasound showed severe right hydronephrosis  Now the follow up ultrasound shows only mild hydronephrosis on the right  Unfortunately he  Has a 4 mm stone at the  right UVJ  He has some intermitant pain  Urine is clear  Patient has  see above      Objective:     VITALS:    Vitals:    05/11/18 0935   BP: 118/62           LABS:  Lab Results   Component Value Date    HGB 16 2 10/27/2017    HCT 46 5 10/27/2017    WBC 7 57 10/27/2017     10/27/2017   ]    Lab Results   Component Value Date     05/05/2018    K 4 1 05/05/2018     05/05/2018    CO2 27 05/05/2018    BUN 16 05/05/2018    CREATININE 1 11 05/05/2018    CALCIUM 9 0 05/05/2018    GLUCOSE 84 10/27/2017   ]    INPATIENT MEDS:    Current Outpatient Prescriptions:     IBUPROFEN ADVANCED FORMULA PO, Take by mouth, Disp: , Rfl:     oxyCODONE-acetaminophen (PERCOCET) 5-325 mg per tablet, Take 1 tablet by mouth every 4 (four) hours as needed for moderate pain Max Daily Amount: 6 tablets, Disp: 15 tablet, Rfl: 0    tamsulosin (FLOMAX) 0 4 mg, Take 1 capsule (0 4 mg total) by mouth daily with dinner, Disp: 15 capsule, Rfl: 0      Physical Exam:   /62 (BP Location: Right arm, Patient Position: Sitting, Cuff Size: Adult)   Ht 5' 7 5" (1 715 m)   Wt 94 8 kg (209 lb)   BMI 32 25 kg/m²   GEN: no acute distress    RESP: breathing comfortably with no accessory muscle use    ABD: right CVA tenderness   INCISION:    EXT: no significant peripheral edema     RADIOLOGY:   RENAL ULTRASOUND  IMPRESSION:        1  Improving right hydronephrosis which is now mild  2   4 mm calculus right ureterovesicular junction  3  Normal left kidney  4    Bilateral ureteral jets detected indicative of ureteral patency  Assessment:   1  Right UVJ calculi- 4 mm  2   Extensive right renal lithiasis resolved     Plan:   - follow up ultrasound in 10-14 days  - try to avoid U-scope if possble   -  -  -

## 2018-05-18 DIAGNOSIS — N20.0 KIDNEY STONES: Primary | ICD-10-CM

## 2018-05-22 ENCOUNTER — HOSPITAL ENCOUNTER (OUTPATIENT)
Dept: RADIOLOGY | Facility: HOSPITAL | Age: 38
Discharge: HOME | End: 2018-05-22
Payer: COMMERCIAL

## 2018-05-22 ENCOUNTER — TRANSCRIBE ORDERS (OUTPATIENT)
Dept: RADIOLOGY | Facility: HOSPITAL | Age: 38
End: 2018-05-22

## 2018-05-22 DIAGNOSIS — N20.0 KIDNEY STONES: ICD-10-CM

## 2018-05-22 PROCEDURE — 51798 US URINE CAPACITY MEASURE: CPT

## 2018-05-29 ENCOUNTER — TELEPHONE (OUTPATIENT)
Dept: UROLOGY | Facility: CLINIC | Age: 38
End: 2018-05-29

## 2018-05-29 DIAGNOSIS — N20.1 CALCULUS OF URETER: Primary | ICD-10-CM

## 2018-05-29 NOTE — TELEPHONE ENCOUNTER
I called pt 's wife Latrice Hernandez and spoke with her about scheduling Kiran Conn for a cysto/R  Retro/ R  Ureteroscopy w/ laser/ R  Stent insert with a KUB prior to procedure  Latrice Hernandez said that she wanted to speak with Kiran Conn before scheduling anything  She had mentioned that pt  Was talking about going for a 2nd opinion but she will talk to him about it tonight and then get back to me about scheduling this procedure

## 2018-05-29 NOTE — PROGRESS NOTES
65-year-old male who has been plagued by multiple stones  Ultrasound in April showed a renal calculus and minimal dilatation of the right collecting system  He has had persistent flank pain  Recent ultrasound shows prominent hydronephrosis  Martirnettie Barnard may have migrated into the ureter  I discussed this with the wife and suggested   Cystoscopy and x-ray retrograde, possible stone manipulation   She will discuss this with her  and contact us for further management

## 2018-06-05 ENCOUNTER — TELEPHONE (OUTPATIENT)
Dept: UROLOGY | Facility: CLINIC | Age: 38
End: 2018-06-05

## 2018-06-05 DIAGNOSIS — N20.2 CALCULUS OF KIDNEY WITH CALCULUS OF URETER: Primary | ICD-10-CM

## 2018-06-05 NOTE — PROGRESS NOTES
Patient apparently did pass a stone  I will get a follow-up ultrasound to determine if this has resolved the hydronephrosis

## 2018-06-05 NOTE — TELEPHONE ENCOUNTER
06/05/2018 @ 9:32am   Edmundo Lovelace called this morning to let us know that pt  Passed a large stone on 5/30/18 he does have stone  Do you want to order any follow up testing? Pt is going for a second opinion to see Dr Ewa Huitron on 6/11/18  Edmundo Lovelace wanted to know if you wanted to order any follow up testing to see the status of his hydro?    06/05/2018 @ 10:28 am  Per Dr Langston  called Edmundo Lovelace to inform her that Dr Bonnie Mckinney would like Shona Favre to have a renal u/s done since he just passed a stone  I gave her the scheduling phone number and she will schedule as soon as they can

## 2018-06-08 NOTE — PROGRESS NOTES
6/11/2018    Luz Hammond  1980  [id number]    Discussion and Plan    Dietary and hydration recommendations were provided help minimize future stone risk  As there is some uncertainty as to whether there is a persistent ureteral stone versus a ureteral stricture or complete resolution of above, I have advised performing CT scan with and without IV contrast   Further recommendations to follow thereafter  We also reviewed the prospect of a complete metabolic workup given the frequency of stone episodes  All questions answered at this time  1  Calculus of ureter  - CT abdomen pelvis w wo contrast; Future    2  Hydronephrosis of right kidney  - CT abdomen pelvis w wo contrast; Future    Assessment      Patient Active Problem List   Diagnosis    Calculus of ureter    Hydronephrosis of right kidney       History of Present Illness    Estefany Guillen is a 44 y o  male seen today in regards to a fairly extensive history of renal lithiasis on the right side  He had a stent removed and his previous ultrasound showed severe right hydronephrosis  Subsequently developed flank pain  He was thought to perhaps have a distal stone versus a ureteral stricture and had been advised to consider repeat ureteroscopy  Patient presents today for further evaluation now having passed a stone approximately 2 weeks ago  He notes only intermittent right flank discomfort  No fever or chills  Denies any urinary complaints other than nocturia x2  No gross hematuria  There is a strong family history of nephrolithiasis  Prior stones were calcium oxalate      Urinary Symptom Assessment        Past Medical History  Past Medical History:   Diagnosis Date    Chronic kidney disease     KIDNEY STONES    Kidney stone        Past Social History  Past Surgical History:   Procedure Laterality Date    CYSTOSCOPY W/ LASER LITHOTRIPSY Right 8/24/2017    Procedure: CYSTOSCOPY URETEROSCOPY WITH LITHOTRIPSY HOLMIUM LASER, RETROGRADE PYELOGRAM AND INSERTION STENT URETERAL;  Surgeon: Karena Silva MD;  Location: [facility];  Service: Urology    CYSTOSCOPY W/ LASER LITHOTRIPSY Right 9/14/2017    Procedure: CYSTOSCOPY; RETROGRADE PYELOGRAM; URETEROSCOPY WITH HOLMIUM LASER; stone extraction ,EXCHANGE OF RIGHT URETERAL STENT;  Surgeon: Karena Silva MD;  Location: [facility];  Service: Urology    EXTRACORPOREAL SHOCK WAVE LITHOTRIPSY Right 8/10/2017    Procedure: Alejandrina Holly SHOCKWAVE (ESWL) WITH CYSTOSCOPY AND INSERTION STENT URETERAL;  Surgeon: Karena Silva MD;  [facility];  Service: Urology    WA CYSTOURETHROSCOPY,URETER CATHETER Right 11/9/2017    Procedure: CYSTOSCOPY RETROGRADE PYELOGRAM WITH URETEROSCOPY WITH LASER AND RIGHT STENT EXCHANGE;  Surgeon: Karena Silva MD;  [facility];  Service: Urology    WA CYSTOURETHROSCOPY,URETER CATHETER N/A 1/4/2018    Procedure: CYSTOSCOPY, BILATERAL RETROGRADE PYELOGRAM WITH RIGHT URETEROSCOPY WITH HYDRODILATION OF STRICTURE , RIGHT STENT INSERTION X 2;  Surgeon: Karena Silva MD;  [facility];  Service: Urology       Past Family History  Family History   Problem Relation Age of Onset    Kidney disease Mother     Diabetes Mother     Hypertension Mother     Urolithiasis Mother     No Known Problems Father        Past Social history  Social History     Social History    Marital status: /Civil Union     Spouse name: N/A    Number of children: N/A    Years of education: N/A     Occupational History    Warehouse      Social History Main Topics    Smoking status: Current Some Day Smoker     Types: Cigars    Smokeless tobacco: Never Used      Comment: CIGAR - 1-2 per month    Alcohol use 4 2 oz/week     7 Cans of beer per week      Comment: occ      Drug use: No    Sexual activity: Not on file     Other Topics Concern    Not on file     Social History Narrative    No narrative on file       Current Medications  Current Outpatient Prescriptions   Medication Sig Dispense Refill    IBUPROFEN ADVANCED FORMULA PO Take by mouth      oxyCODONE-acetaminophen (PERCOCET) 5-325 mg per tablet Take 1 tablet by mouth every 4 (four) hours as needed for moderate pain Max Daily Amount: 6 tablets 15 tablet 0    tamsulosin (FLOMAX) 0 4 mg Take 1 capsule (0 4 mg total) by mouth daily with dinner 15 capsule 0     No current facility-administered medications for this visit  Allergies  No Known Allergies    Past Medical History, Social History, Family History, medications and allergies were reviewed  Review of Systems  Review of Systems   Constitutional: Negative  HENT: Negative  Eyes: Negative  Respiratory: Negative  Cardiovascular: Negative  Gastrointestinal: Negative  Endocrine: Negative  Genitourinary: Negative for decreased urine volume, difficulty urinating, frequency and hematuria  Musculoskeletal: Negative  Skin: Negative  Neurological: Negative  Hematological: Negative  Psychiatric/Behavioral: Negative  Vitals  Vitals:    06/11/18 1254   BP: 128/88   Pulse: 84   Weight: 95 2 kg (209 lb 12 8 oz)   Height: 5' 9" (1 753 m)         Physical Exam    Physical Exam   Constitutional: He is oriented to person, place, and time  He appears well-developed and well-nourished  HENT:   Head: Normocephalic and atraumatic  Eyes: Pupils are equal, round, and reactive to light  Neck: Normal range of motion  Cardiovascular: Normal rate, regular rhythm and normal heart sounds  Pulmonary/Chest: Effort normal and breath sounds normal  No accessory muscle usage  No respiratory distress  Abdominal: Soft  Normal appearance and bowel sounds are normal  There is no tenderness  Musculoskeletal: Normal range of motion  Neurological: He is alert and oriented to person, place, and time  Skin: Skin is warm, dry and intact  Psychiatric: He has a normal mood and affect   His speech is normal  Cognition and memory are normal    Nursing note and vitals reviewed        Results    Below listed labs, pathology results, and radiology images were personally reviewed:    No results found for: PSA  Lab Results   Component Value Date    GLUCOSE 84 10/27/2017    CALCIUM 9 0 05/05/2018     05/05/2018    K 4 1 05/05/2018    CO2 27 05/05/2018     05/05/2018    BUN 16 05/05/2018    CREATININE 1 11 05/05/2018     Lab Results   Component Value Date    WBC 7 57 10/27/2017    HGB 16 2 10/27/2017    HCT 46 5 10/27/2017    MCV 89 10/27/2017     10/27/2017       No results found for this or any previous visit (from the past 1 hour(s)) ]

## 2018-06-09 ENCOUNTER — APPOINTMENT (OUTPATIENT)
Dept: LAB | Facility: CLINIC | Age: 38
End: 2018-06-09
Payer: COMMERCIAL

## 2018-06-09 ENCOUNTER — TRANSCRIBE ORDERS (OUTPATIENT)
Dept: LAB | Facility: CLINIC | Age: 38
End: 2018-06-09

## 2018-06-09 DIAGNOSIS — Z11.3 SCREENING EXAMINATION FOR VENEREAL DISEASE: ICD-10-CM

## 2018-06-09 DIAGNOSIS — Z11.4 SCREENING FOR HUMAN IMMUNODEFICIENCY VIRUS: ICD-10-CM

## 2018-06-09 DIAGNOSIS — Z22.7 INACTIVE TUBERCULOSIS: ICD-10-CM

## 2018-06-09 DIAGNOSIS — Z22.7 INACTIVE TUBERCULOSIS: Primary | ICD-10-CM

## 2018-06-09 PROCEDURE — 86480 TB TEST CELL IMMUN MEASURE: CPT

## 2018-06-09 PROCEDURE — 86592 SYPHILIS TEST NON-TREP QUAL: CPT

## 2018-06-09 PROCEDURE — 87491 CHLMYD TRACH DNA AMP PROBE: CPT

## 2018-06-09 PROCEDURE — 36415 COLL VENOUS BLD VENIPUNCTURE: CPT

## 2018-06-09 PROCEDURE — 87591 N.GONORRHOEAE DNA AMP PROB: CPT

## 2018-06-11 ENCOUNTER — OFFICE VISIT (OUTPATIENT)
Dept: UROLOGY | Facility: AMBULATORY SURGERY CENTER | Age: 38
End: 2018-06-11
Payer: COMMERCIAL

## 2018-06-11 VITALS
HEIGHT: 69 IN | BODY MASS INDEX: 31.07 KG/M2 | DIASTOLIC BLOOD PRESSURE: 88 MMHG | SYSTOLIC BLOOD PRESSURE: 128 MMHG | HEART RATE: 84 BPM | WEIGHT: 209.8 LBS

## 2018-06-11 DIAGNOSIS — N13.30 HYDRONEPHROSIS OF RIGHT KIDNEY: ICD-10-CM

## 2018-06-11 DIAGNOSIS — N20.1 CALCULUS OF URETER: Primary | ICD-10-CM

## 2018-06-11 LAB
CHLAMYDIA DNA CVX QL NAA+PROBE: NORMAL
N GONORRHOEA DNA GENITAL QL NAA+PROBE: NORMAL
RPR SER QL: NORMAL

## 2018-06-11 PROCEDURE — 99244 OFF/OP CNSLTJ NEW/EST MOD 40: CPT | Performed by: UROLOGY

## 2018-06-12 LAB
ANNOTATION COMMENT IMP: NORMAL
GAMMA INTERFERON BACKGROUND BLD IA-ACNC: 0.14 IU/ML
M TB IFN-G BLD-IMP: NEGATIVE
M TB IFN-G CD4+ BCKGRND COR BLD-ACNC: 0.18 IU/ML
M TB IFN-G CD4+ T-CELLS BLD-ACNC: 0.32 IU/ML
MITOGEN IGNF BLD-ACNC: 8.22 IU/ML
QUANTIFERON-TB GOLD IN TUBE: NORMAL
SERVICE CMNT-IMP: NORMAL

## 2018-06-15 ENCOUNTER — HOSPITAL ENCOUNTER (OUTPATIENT)
Dept: RADIOLOGY | Facility: HOSPITAL | Age: 38
Discharge: HOME | End: 2018-06-15
Attending: UROLOGY
Payer: COMMERCIAL

## 2018-06-15 DIAGNOSIS — N13.30 HYDRONEPHROSIS OF RIGHT KIDNEY: ICD-10-CM

## 2018-06-15 DIAGNOSIS — N20.1 CALCULUS OF URETER: ICD-10-CM

## 2018-06-15 PROCEDURE — 74178 CT ABD&PLV WO CNTR FLWD CNTR: CPT

## 2018-06-15 RX ADMIN — IOHEXOL 120 ML: 350 INJECTION, SOLUTION INTRAVENOUS at 18:50

## 2018-06-21 ENCOUNTER — TELEPHONE (OUTPATIENT)
Dept: UROLOGY | Facility: CLINIC | Age: 38
End: 2018-06-21

## 2018-06-21 NOTE — TELEPHONE ENCOUNTER
06/21/18  I called pt 's wife to f/u and see if pt  Had made up his mind if he wanted to do the cysto/esme  Retrograde/ ureteroscopy with litho and stent placement  There was no answer so I did l/m asking Alicia Gaucher to call our office back with a update on pt       06/25/18 @ 10:43am  Pt 's wife Alicia Gaucher returned my call from last week and said that pt  Will be following up with Dr Isiah Hawthorne from now on  I will inform Dr Iftikhar Stinson of this conversation

## 2018-06-25 ENCOUNTER — OFFICE VISIT (OUTPATIENT)
Dept: UROLOGY | Facility: AMBULATORY SURGERY CENTER | Age: 38
End: 2018-06-25
Payer: COMMERCIAL

## 2018-06-25 VITALS
HEART RATE: 74 BPM | HEIGHT: 69 IN | BODY MASS INDEX: 31.04 KG/M2 | DIASTOLIC BLOOD PRESSURE: 80 MMHG | SYSTOLIC BLOOD PRESSURE: 118 MMHG | WEIGHT: 209.6 LBS

## 2018-06-25 DIAGNOSIS — N20.1 CALCULUS OF URETER: ICD-10-CM

## 2018-06-25 DIAGNOSIS — N13.30 HYDRONEPHROSIS OF RIGHT KIDNEY: Primary | ICD-10-CM

## 2018-06-25 PROCEDURE — 99214 OFFICE O/P EST MOD 30 MIN: CPT | Performed by: UROLOGY

## 2018-08-02 NOTE — PRE-PROCEDURE INSTRUCTIONS
No outpatient prescriptions have been marked as taking for the 8/17/18 encounter [facility] Encounter)      Pre op and showering instructions using an antibacterial soap reviewed with his wife

## 2018-08-17 ENCOUNTER — APPOINTMENT (OUTPATIENT)
Dept: RADIOLOGY | Facility: HOSPITAL | Age: 38
End: 2018-08-17
Payer: COMMERCIAL

## 2018-08-17 ENCOUNTER — ANESTHESIA EVENT (OUTPATIENT)
Dept: PERIOP | Facility: HOSPITAL | Age: 38
End: 2018-08-17
Payer: COMMERCIAL

## 2018-08-17 ENCOUNTER — HOSPITAL ENCOUNTER (OUTPATIENT)
Facility: HOSPITAL | Age: 38
Setting detail: OUTPATIENT SURGERY
Discharge: HOME | End: 2018-08-17
Attending: UROLOGY | Admitting: UROLOGY
Payer: COMMERCIAL

## 2018-08-17 ENCOUNTER — ANESTHESIA (OUTPATIENT)
Dept: PERIOP | Facility: HOSPITAL | Age: 38
End: 2018-08-17
Payer: COMMERCIAL

## 2018-08-17 VITALS
WEIGHT: 208 LBS | TEMPERATURE: 97.3 F | BODY MASS INDEX: 30.81 KG/M2 | SYSTOLIC BLOOD PRESSURE: 123 MMHG | RESPIRATION RATE: 18 BRPM | OXYGEN SATURATION: 95 % | HEART RATE: 69 BPM | DIASTOLIC BLOOD PRESSURE: 68 MMHG | HEIGHT: 69 IN

## 2018-08-17 DIAGNOSIS — N13.30 HYDRONEPHROSIS OF RIGHT KIDNEY: Primary | ICD-10-CM

## 2018-08-17 PROCEDURE — 52351 CYSTOURETERO & OR PYELOSCOPE: CPT | Performed by: UROLOGY

## 2018-08-17 PROCEDURE — 52332 CYSTOSCOPY AND TREATMENT: CPT | Performed by: UROLOGY

## 2018-08-17 PROCEDURE — C1769 GUIDE WIRE: HCPCS | Performed by: UROLOGY

## 2018-08-17 PROCEDURE — 74420 UROGRAPHY RTRGR +-KUB: CPT

## 2018-08-17 PROCEDURE — C2617 STENT, NON-COR, TEM W/O DEL: HCPCS | Performed by: UROLOGY

## 2018-08-17 DEVICE — INLAY OPTIMA URETERAL STENT W/O GUIDEWIRE
Type: IMPLANTABLE DEVICE | Site: URETER | Status: FUNCTIONAL
Brand: BARD® INLAY OPTIMA® URETERAL STENT

## 2018-08-17 RX ORDER — ACETAMINOPHEN 325 MG/1
650 TABLET ORAL EVERY 4 HOURS PRN
Status: DISCONTINUED | OUTPATIENT
Start: 2018-08-17 | End: 2018-08-17 | Stop reason: HOSPADM

## 2018-08-17 RX ORDER — MIDAZOLAM HYDROCHLORIDE 1 MG/ML
INJECTION INTRAMUSCULAR; INTRAVENOUS AS NEEDED
Status: DISCONTINUED | OUTPATIENT
Start: 2018-08-17 | End: 2018-08-17 | Stop reason: SURG

## 2018-08-17 RX ORDER — HYDROCODONE BITARTRATE AND ACETAMINOPHEN 5; 325 MG/1; MG/1
1 TABLET ORAL EVERY 6 HOURS PRN
Qty: 20 TABLET | Refills: 0 | Status: SHIPPED | OUTPATIENT
Start: 2018-08-17 | End: 2018-08-27

## 2018-08-17 RX ORDER — MAGNESIUM HYDROXIDE 1200 MG/15ML
LIQUID ORAL AS NEEDED
Status: DISCONTINUED | OUTPATIENT
Start: 2018-08-17 | End: 2018-08-17 | Stop reason: HOSPADM

## 2018-08-17 RX ORDER — PROPOFOL 10 MG/ML
INJECTION, EMULSION INTRAVENOUS AS NEEDED
Status: DISCONTINUED | OUTPATIENT
Start: 2018-08-17 | End: 2018-08-17 | Stop reason: SURG

## 2018-08-17 RX ORDER — ONDANSETRON 2 MG/ML
INJECTION INTRAMUSCULAR; INTRAVENOUS AS NEEDED
Status: DISCONTINUED | OUTPATIENT
Start: 2018-08-17 | End: 2018-08-17 | Stop reason: SURG

## 2018-08-17 RX ORDER — ONDANSETRON 2 MG/ML
4 INJECTION INTRAMUSCULAR; INTRAVENOUS ONCE AS NEEDED
Status: DISCONTINUED | OUTPATIENT
Start: 2018-08-17 | End: 2018-08-17 | Stop reason: HOSPADM

## 2018-08-17 RX ORDER — FENTANYL CITRATE 50 UG/ML
INJECTION, SOLUTION INTRAMUSCULAR; INTRAVENOUS AS NEEDED
Status: DISCONTINUED | OUTPATIENT
Start: 2018-08-17 | End: 2018-08-17 | Stop reason: SURG

## 2018-08-17 RX ORDER — HYDROCODONE BITARTRATE AND ACETAMINOPHEN 5; 325 MG/1; MG/1
1 TABLET ORAL EVERY 4 HOURS PRN
Status: DISCONTINUED | OUTPATIENT
Start: 2018-08-17 | End: 2018-08-17 | Stop reason: HOSPADM

## 2018-08-17 RX ORDER — SODIUM CHLORIDE 9 MG/ML
INJECTION, SOLUTION INTRAVENOUS CONTINUOUS PRN
Status: DISCONTINUED | OUTPATIENT
Start: 2018-08-17 | End: 2018-08-17 | Stop reason: SURG

## 2018-08-17 RX ORDER — ONDANSETRON 2 MG/ML
4 INJECTION INTRAMUSCULAR; INTRAVENOUS EVERY 4 HOURS PRN
Status: DISCONTINUED | OUTPATIENT
Start: 2018-08-17 | End: 2018-08-17 | Stop reason: HOSPADM

## 2018-08-17 RX ORDER — FENTANYL CITRATE/PF 50 MCG/ML
25 SYRINGE (ML) INJECTION
Status: DISCONTINUED | OUTPATIENT
Start: 2018-08-17 | End: 2018-08-17 | Stop reason: HOSPADM

## 2018-08-17 RX ORDER — MORPHINE SULFATE 2 MG/ML
2 INJECTION, SOLUTION INTRAMUSCULAR; INTRAVENOUS
Status: DISCONTINUED | OUTPATIENT
Start: 2018-08-17 | End: 2018-08-17 | Stop reason: HOSPADM

## 2018-08-17 RX ORDER — CIPROFLOXACIN 500 MG/1
500 TABLET, FILM COATED ORAL 2 TIMES DAILY
Qty: 6 TABLET | Refills: 0 | Status: SHIPPED | OUTPATIENT
Start: 2018-08-17 | End: 2018-08-20

## 2018-08-17 RX ADMIN — SODIUM CHLORIDE: 0.9 INJECTION, SOLUTION INTRAVENOUS at 12:47

## 2018-08-17 RX ADMIN — PROPOFOL 200 MG: 10 INJECTION, EMULSION INTRAVENOUS at 12:51

## 2018-08-17 RX ADMIN — MIDAZOLAM HYDROCHLORIDE 2 MG: 1 INJECTION, SOLUTION INTRAMUSCULAR; INTRAVENOUS at 12:47

## 2018-08-17 RX ADMIN — ONDANSETRON 4 MG: 2 INJECTION INTRAMUSCULAR; INTRAVENOUS at 12:54

## 2018-08-17 RX ADMIN — LIDOCAINE HYDROCHLORIDE 100 MG: 20 INJECTION, SOLUTION INTRAVENOUS at 12:51

## 2018-08-17 RX ADMIN — DEXAMETHASONE SODIUM PHOSPHATE 4 MG: 10 INJECTION INTRAMUSCULAR; INTRAVENOUS at 12:54

## 2018-08-17 RX ADMIN — CEFAZOLIN SODIUM 2000 MG: 2 SOLUTION INTRAVENOUS at 12:47

## 2018-08-17 RX ADMIN — FENTANYL CITRATE 100 MCG: 50 INJECTION INTRAMUSCULAR; INTRAVENOUS at 12:47

## 2018-08-17 NOTE — DISCHARGE INSTRUCTIONS

## 2018-08-17 NOTE — ANESTHESIA POSTPROCEDURE EVALUATION
Post-Op Assessment Note      CV Status:  Stable    Mental Status:  Alert and awake    Hydration Status:  Stable and euvolemic    PONV Controlled:  Controlled    Airway Patency:  Patent and adequate    Post Op Vitals Reviewed: Yes          Staff: CRNA           /75 (08/17/18 1316)    Temp      Pulse 69 (08/17/18 1316)   Resp 20 (08/17/18 1316)    SpO2 100 % (08/17/18 1316)

## 2018-08-17 NOTE — OP NOTE
OPERATIVE REPORT  PATIENT NAME: Shilpa Stubbs    :  1980  MRN: [id number]  Pt Location: AN OR ROOM 04    SURGERY DATE: 2018    Surgeon(s) and Role:     * Jaclyn Cervantes MD - Primary    Preop Diagnosis:  Hydronephrosis of right kidney [N13 30]    Post-Op Diagnosis Codes: * Hydronephrosis of right kidney [N13 30]    Procedure(s) (LRB):  CYSTOSCOPY URETEROSCOPY, RETROGRADE PYELOGRAM AND INSERTION STENT URETERAL (Right)    Specimen(s):  * No specimens in log *    Estimated Blood Loss:   Minimal    Drains:  Ureteral Drain/Stent Right ureter 7 Fr  (Active)   Number of days: 0       Anesthesia Type:   General    Operative Indications:  Hydronephrosis of right kidney [N13 30]      Operative Findings:  Mild stricture at level of L5    Complications:   None    Procedure and Technique:    After informed consent including the risks of bleeding, infection, ureteral injury, and need for secondary procedures, patient was placed supine in the operating room theater  Gen  anesthesia was administered  He was then placed in the dorsal lithotomy position and sterilely prepped and draped in usual fashion  Cystoscopy was performed the 21 Romansh cystoscope 30° lens  This revealed a normal pendulous urethra  Prostate was +1 enlarged  Inspection of the bladder revealed no abnormalities  Retrograde via 5 fr cath revealed an area of narrowing near L5 with mild proximal hydroureter  Sensor guidewire was placed via right ureteral orifice  Semi rigid ureteroscopy was performed with the Olympus scope  A ring like arrow of narrowing was seen which was dilated  Ureteroscope was gradually withdrawn with no stone seen along the course of the ureter  Cystoscope was reassembled over the guidewire and a 7 x 26 double-J stent inserted without difficulty  Patient awakened from anesthesia having tolerated well       I was present for the entire procedure    Patient Disposition:  PACU     SIGNATURE: Jaclyn Cervantes MD  DATE: August 17, 2018  TIME: 1:11 PM

## 2018-08-17 NOTE — ANESTHESIA PREPROCEDURE EVALUATION
Review of Systems/Medical History  Patient summary reviewed  Chart reviewed  History of anesthetic complications PONV    Cardiovascular  Negative cardio ROS Exercise tolerance (METS): >4,     Pulmonary  Negative pulmonary ROS        GI/Hepatic  Negative GI/hepatic ROS          Kidney stones,        Endo/Other  Negative endo/other ROS      GYN       Hematology  Negative hematology ROS      Musculoskeletal  Negative musculoskeletal ROS        Neurology  Negative neurology ROS      Psychology   Negative psychology ROS              Physical Exam    Airway    Mallampati score: I  TM Distance: >3 FB  Neck ROM: full     Dental   No notable dental hx     Cardiovascular  Comment: Negative ROS, Cardiovascular exam normal    Pulmonary  Pulmonary exam normal     Other Findings    Anesthesia Plan  ASA Score- 2     Anesthesia Type- general with ASA Monitors  Additional Monitors:   Airway Plan: LMA  Plan Factors-    Induction- intravenous  Postoperative Plan- Plan for postoperative opioid use  Informed Consent- Anesthetic plan and risks discussed with patient and spouse  I personally reviewed this patient with the CRNA  Discussed and agreed on the Anesthesia Plan with the CRNA  Layla Mcelroy

## 2018-08-17 NOTE — H&P
Shilpa Stubbs  1980  [id number]     Discussion and Plan     Etiologies of the CT scan findings are reviewed with patient and wife  This may be attributable either to an impacted calculus fragment versus stricture within the mid ureter  Given the persistence of symptoms, I have advised cystoscopy, right retrograde pyelogram and right ureteroscopy with possible holmium laser incision of a stricture of present  The risks including bleeding, infection, ureteral injury, need for secondary procedures explained  Consent obtained at this time      1  Hydronephrosis of right kidney  - Case request operating room: URETEROSCOPY, retrograde, homium laser stent; Standing  - Case request operating room: URETEROSCOPY, retrograde, homium laser stent     2  Calculus of ureter        Assessment            Patient Active Problem List   Diagnosis    Calculus of ureter    Hydronephrosis of right kidney         History of Present Illness     Temo Law is a 44 y o  male seen today in regards to a history of fairly extensive history of renal lithiasis on the right side  He had a stent removed and his previous ultrasound showed severe right hydronephrosis   Subsequently developed flank pain   He was thought to perhaps have a distal stone versus a ureteral stricture and had been advised to consider repeat ureteroscopy  Arvind Sanchez presents today for further evaluation now having passed a stone approximately 2 weeks ago  [facility] notes only intermittent right flank discomfort   No fever or chills   Denies any urinary complaints other than nocturia x2   No gross hematuria  Marv Resides is a strong family history of nephrolithiasis   Prior stones were calcium oxalate  A CT scan was obtained which in fact confirms moderate hydronephrosis to the level of the mid ureter where there is tapering and a punctate calcification seen    Distal ureter otherwise appears normal   He continues to have persistent mild right flank pain      Urinary Symptom Assessment           Past Medical History  Medical History        Past Medical History:   Diagnosis Date    Chronic kidney disease       KIDNEY STONES    Kidney stone              Past Social History  Surgical History         Past Surgical History:   Procedure Laterality Date    CYSTOSCOPY W/ LASER LITHOTRIPSY Right 8/24/2017     Procedure: CYSTOSCOPY URETEROSCOPY WITH LITHOTRIPSY HOLMIUM LASER, RETROGRADE PYELOGRAM AND INSERTION STENT URETERAL;  Surgeon: Yandy Vasquez MD;  [facility];  Service: Urology    CYSTOSCOPY W/ LASER LITHOTRIPSY Right 9/14/2017     Procedure: CYSTOSCOPY; RETROGRADE PYELOGRAM; URETEROSCOPY WITH HOLMIUM LASER; stone extraction ,EXCHANGE OF RIGHT URETERAL STENT;  Surgeon: Yandy Vasquez MD;  [facility];  Service: Urology    EXTRACORPOREAL SHOCK WAVE LITHOTRIPSY Right 8/10/2017     Procedure: David Glassing SHOCKWAVE (ESWL) WITH CYSTOSCOPY AND INSERTION STENT URETERAL;  Surgeon: Yandy Vasquez MD;  [facility];  Service: Urology    [place] CYSTOURETHROSCOPY,URETER CATHETER Right 11/9/2017     Procedure: CYSTOSCOPY RETROGRADE PYELOGRAM WITH URETEROSCOPY WITH LASER AND RIGHT STENT EXCHANGE;  Surgeon: Yandy Vasquez MD;  [facility];  Service: Urology    [place] CYSTOURETHROSCOPY,URETER CATHETER N/A 1/4/2018     Procedure: CYSTOSCOPY, BILATERAL RETROGRADE PYELOGRAM WITH RIGHT URETEROSCOPY WITH HYDRODILATION OF STRICTURE , RIGHT STENT INSERTION X 2;  Surgeon: Yandy Vasquez MD;  [facility];  Service: Urology            Past Family History        Family History   Problem Relation Age of Onset    Kidney disease Mother      Diabetes Mother      Hypertension Mother      Urolithiasis Mother      No Known Problems Father           Past Social history  Social History   Social History            Social History    Marital status: /Civil Union       Spouse name: N/A    Number of children: N/A    Years of education: N/A     Occupational History    [facility]               Social History Main Topics    Smoking status: Light Tobacco Smoker       Types: Cigars    Smokeless tobacco: Never Used         Comment: CIGAR - 1-2 per month    Alcohol use 4 2 oz/week       7 Cans of beer per week         Comment: occ   Drug use: No    Sexual activity: Not on file           Other Topics Concern    Not on file          Social History Narrative    No narrative on file            Current Medications  Current Medications          Current Outpatient Prescriptions   Medication Sig Dispense Refill    IBUPROFEN ADVANCED FORMULA PO Take by mouth        oxyCODONE-acetaminophen (PERCOCET) 5-325 mg per tablet Take 1 tablet by mouth every 4 (four) hours as needed for moderate pain Max Daily Amount: 6 tablets 15 tablet 0    tamsulosin (FLOMAX) 0 4 mg Take 1 capsule (0 4 mg total) by mouth daily with dinner 15 capsule 0      No current facility-administered medications for this visit              Allergies  No Known Allergies     Past Medical History, Social History, Family History, medications and allergies were reviewed      Review of Systems  Review of Systems   Constitutional: Negative  HENT: Negative  Eyes: Negative  Respiratory: Negative  Cardiovascular: Negative  Gastrointestinal: Negative  Endocrine: Negative  Genitourinary: Positive for flank pain  Negative for decreased urine volume, difficulty urinating, frequency, hematuria and urgency  Skin: Negative  Neurological: Negative  Hematological: Negative  Psychiatric/Behavioral: Negative           Vitals  Vitals       Vitals:     06/25/18 1007   BP: 118/80   BP Location: Right arm   Patient Position: Sitting   Cuff Size: Adult   Pulse: 74   Weight: 95 1 kg (209 lb 9 6 oz)   Height: 5' 9" (1 753 m)               Physical Exam     Physical Exam   Constitutional: He is oriented to person, place, and time  He appears well-developed and well-nourished     HENT: Head: Normocephalic and atraumatic  Eyes: Pupils are equal, round, and reactive to light  Neck: Normal range of motion  Cardiovascular: Normal rate, regular rhythm and normal heart sounds  Pulmonary/Chest: Effort normal and breath sounds normal  No accessory muscle usage  No respiratory distress  Abdominal: Soft  Normal appearance and bowel sounds are normal  There is no tenderness  Musculoskeletal: Normal range of motion  Neurological: He is alert and oriented to person, place, and time  Skin: Skin is warm, dry and intact  Psychiatric: He has a normal mood and affect  His speech is normal  Cognition and memory are normal    Nursing note and vitals reviewed         Results     Below listed labs, pathology results, and radiology images were personally reviewed:     No results found for: PSA        Lab Results   Component Value Date     GLUCOSE 84 10/27/2017     CALCIUM 9 0 05/05/2018      05/05/2018     K 4 1 05/05/2018     CO2 27 05/05/2018      05/05/2018     BUN 16 05/05/2018     CREATININE 1 11 05/05/2018            Lab Results   Component Value Date     WBC 7 57 10/27/2017     HGB 16 2 10/27/2017     HCT 46 5 10/27/2017     MCV 89 10/27/2017      10/27/2017         Recent Results   No results found for this or any previous visit (from the past 1 hour(s))     ]     CT ABDOMEN AND PELVIS WITH AND WITHOUT IV CONTRAST     INDICATION:   N20 1: Calculus of ureter  N13 30: Unspecified hydronephrosis   Right flank pain   History of lithotripsy      COMPARISON:  None      TECHNIQUE: CT of the kidneys was performed without intravenous contrast   Dynamic postcontrast CT evaluation of the abdomen and pelvis was performed in both nephrographic and delayed phases after the administration of intravenous contrast   Axial,   sagittal, and coronal 2D reformatted images were created from the source data and submitted for interpretation       Radiation dose length product (DLP) for this visit:  0945 49 mGy-cm    This examination, like all CT scans performed in the [facility], was performed utilizing techniques to minimize radiation dose exposure, including the use of   iterative reconstruction and automated exposure control      IV Contrast:  120 mL of iohexol (OMNIPAQUE)  Enteric Contrast:  Enteric contrast was not administered      FINDINGS:     ABDOMEN     RIGHT KIDNEY AND URETER:  No solid renal mass   No detectable urothelial mass  Moderate hydronephrosis with dilatation and blunting of the calyces   Ureter measuring 7 mm in diameter   There is significant interval reduction in the volume of right renal calculi   The only residual focus in the kidney is seen dependent within the   renal pelvis and measures 2 x 2 mm   An additional punctate calcific focus is also noted in the right mid ureter at the level of L5  Delayed excretion views show incomplete opacification of the distal half of the right ureter but without suspicious dilatation or thickening through this region   The degree of hydronephrosis on review of multiple recent prior ultrasounds appears to be   variable and is presumably chronic, a sequela of prior obstruction   No obstructing abnormality identified at this time      LEFT KIDNEY AND URETER:  No solid renal mass   No detectable urothelial mass  No hydronephrosis or hydroureter   9 mm left upper pole cyst   Nonobstructing mid upper 3 mm calculus, stable   Punctate nonobstructing calculus in the lower pole anteriorly   Also stable   No new calculi        URINARY BLADDER:  No bladder wall mass  No calculi         LOWER CHEST:  No clinically significant abnormality identified in the visualized lower chest      LIVER/BILIARY TREE:  Unremarkable      GALLBLADDER:  No calcified gallstones   No pericholecystic inflammatory change      SPLEEN:  Unremarkable      PANCREAS:  Unremarkable      ADRENAL GLANDS:  Unremarkable      STOMACH AND BOWEL:  Unremarkable      ABDOMINOPELVIC CAVITY:  No ascites   No free intraperitoneal air  No lymphadenopathy      VESSELS:  Unremarkable for patient's age      PELVIS     REPRODUCTIVE ORGANS:  Unremarkable for patient's age      APPENDIX: No findings to suggest appendicitis      ABDOMINAL WALL/INGUINAL REGIONS:  Unremarkable      OSSEOUS STRUCTURES:  No acute fracture or destructive osseous lesion      IMPRESSION:     Persistent moderate right-sided hydronephrosis as detailed above   No acute obstructing abnormality identified   Not significant with altered compared to multiple prior ultrasounds where it appears to be varying in degrees and may be dilated as a result   of prior obstruction      Tiny residual calculi seen within the right collecting system as above   No new stones   Stable nonobstructing left nephrolithiasis          Workstation performed: [id number]      Imaging      CT abdomen pelvis w wo contrast (Order #[id number]) on 6/15/2018 - Imaging Information

## 2018-08-20 ENCOUNTER — TELEPHONE (OUTPATIENT)
Dept: UROLOGY | Facility: AMBULATORY SURGERY CENTER | Age: 38
End: 2018-08-20

## 2018-08-20 NOTE — TELEPHONE ENCOUNTER
Patient managed by Dr Carlos Howard and had R ureteral stent placed on 8/17/18  Wife called to inquire about symptoms patient has been experiencing over the weekend  Reviewed all stent symptoms with wife and advised it is normal to experience them  What is not normal is fever, and to call office if patient develops temp of >100 6  Advised to increase water intake while having stent and take ibuprofen 600 mg every 8 hrs for stent discomfort  Patient has appt with Dr Carlos Howard on 9/10/18  All of wife's questions answered and she verbalized understanding

## 2018-08-23 ENCOUNTER — TELEPHONE (OUTPATIENT)
Dept: UROLOGY | Facility: CLINIC | Age: 38
End: 2018-08-23

## 2018-08-23 NOTE — TELEPHONE ENCOUNTER
Agree with nurse's recommendations  It is not uncommon for patients to experience episodes of gross hematuria with ureteral stent placement

## 2018-08-23 NOTE — TELEPHONE ENCOUNTER
Wife left message on nurse's voicemail returning phone call  Per wife, she is on short break at work, will be able to speak again after 1:30  Attempted to call wife back, unable to speak with wife  Left message, informed wife, will call again after 1:30

## 2018-08-23 NOTE — TELEPHONE ENCOUNTER
Spoke with patient's wife  Per wife, she was concerned as patient had bleeding after having stent placed, but the bleeding stopped by late Saturday  Instructed wife, it is common to have intermittent bleeding with stent  Instructed wife to continue to monitor and have patient hydrate well with water  Wife instructed to call office if patient were to develop a fever, have uncontrollable pain or is unable to empty bladder  Wife verbalized understanding

## 2018-08-23 NOTE — TELEPHONE ENCOUNTER
Patient managed by Dr Panfilo Vasquez, seen in [place] office  Received message on nurse's voicemail from patient's wife  Wife reports patient blood in urine yesterday, reports urine was dark red in color yesterday, unsure, possibly passed a blood clot as well  Wife reports urine lightened to pink color last night, remains pink tinged this morning  No further clots, no fever   Patient had stent placed in OR last week on 8/17/18 by Dr Panfiol Vasquez

## 2018-09-10 NOTE — PROGRESS NOTES
9/12/2018    Clista Courser  1980  [id number]    Discussion and Plan    Patient with established history of recurrent nephrolithiasis in ureteroscopy is now status post dilation of a ureteral stricture and stent insertion  Stent will be maintained for 6 weeks in total prior to removal   Risks and benefits of the office procedure including bleeding infection discussed  All questions answered at this time  1  Hydronephrosis of right kidney  - Cystoscopy; Future    2  Calculus of ureter  - Cystoscopy; Future    Assessment      Patient Active Problem List   Diagnosis    Calculus of ureter    Hydronephrosis of right kidney       History of Present Illness    Vargas Xiong is a 43 y o  male seen today in regards to a history of fairly extensive history of renal lithiasis on the right side  He had a stent removed and his previous ultrasound showed severe right hydronephrosis   Subsequently developed flank pain   He was thought to perhaps have a distal stone versus a ureteral stricture and had been advised to consider repeat ureteroscopy  Thaddeus Hernandezdi presents today for further evaluation now having passed a stone approximately 2 weeks ago  [facility] notes only intermittent right flank discomfort   No fever or chills   Denies any urinary complaints other than nocturia x2   No gross hematuria  Kalpana Khoury is a strong family history of nephrolithiasis  Gaby Kayce stones were calcium oxalate   A CT scan was obtained which in fact confirms moderate hydronephrosis to the level of the mid ureter where there is tapering and a punctate calcification seen   Distal ureter otherwise appears normal   He continues to have persistent mild right flank pain  Status post cystoscopy and right stent insertion for stricture  Tolerating stent reasonably well with occasional hematuria  Operative findings discussed with patient and wife      Urinary Symptom Assessment        Past Medical History  Past Medical History:   Diagnosis Date    Chronic kidney disease     KIDNEY STONES    Kidney stone        Past Social History  Past Surgical History:   Procedure Laterality Date    CYSTOSCOPY W/ LASER LITHOTRIPSY Right 8/24/2017    Procedure: CYSTOSCOPY URETEROSCOPY WITH LITHOTRIPSY HOLMIUM LASER, RETROGRADE PYELOGRAM AND INSERTION STENT URETERAL;  Surgeon: Adriana Marin MD;  [facility];  Service: Urology    CYSTOSCOPY W/ LASER LITHOTRIPSY Right 9/14/2017    Procedure: CYSTOSCOPY; RETROGRADE PYELOGRAM; URETEROSCOPY WITH HOLMIUM LASER; stone extraction ,EXCHANGE OF RIGHT URETERAL STENT;  Surgeon: Adriana Marin MD;  [facility];  Service: Urology    EXTRACORPOREAL SHOCK WAVE LITHOTRIPSY Right 8/10/2017    Procedure: Mauricio Olivia SHOCKWAVE (ESWL) WITH CYSTOSCOPY AND INSERTION STENT URETERAL;  Surgeon: Adriana Marin MD;  [facility];  Service: Urology    [place] RETROGRADE PYELOGRAM  8/17/2018    [place] CYSTO/URETERO W/LITHOTRIPSY &INDWELL STENT INSRT Right 8/17/2018    Procedure: CYSTOSCOPY URETEROSCOPY, RETROGRADE PYELOGRAM AND INSERTION STENT URETERAL;  Surgeon: Irving Rodriguez MD;  [facility];  Service: Urology    [place] CYSTOURETHROSCOPY,URETER CATHETER Right 11/9/2017    Procedure: CYSTOSCOPY RETROGRADE PYELOGRAM WITH URETEROSCOPY WITH LASER AND RIGHT STENT EXCHANGE;  Surgeon: Adriana Marin MD;  [facility];  Service: Urology    [place] CYSTOURETHROSCOPY,URETER CATHETER N/A 1/4/2018    Procedure: CYSTOSCOPY, BILATERAL RETROGRADE PYELOGRAM WITH RIGHT URETEROSCOPY WITH HYDRODILATION OF STRICTURE , RIGHT STENT INSERTION X 2;  Surgeon: Adriana Marin MD;  [facility];  Service: Urology       Past Family History  Family History   Problem Relation Age of Onset    Kidney disease Mother     Diabetes Mother     Hypertension Mother     Urolithiasis Mother     No Known Problems Father        Past Social history  Social History     Social History    Marital status: /Civil Union     Spouse name: N/A  Number of children: N/A    Years of education: N/A     Occupational History    [facility]      Social History Main Topics    Smoking status: Light Tobacco Smoker     Types: Cigars    Smokeless tobacco: Never Used      Comment: CIGAR - 1-2 per month    Alcohol use Yes      Comment: Socially     Drug use: No    Sexual activity: Not on file     Other Topics Concern    Not on file     Social History Narrative    No narrative on file       Current Medications  No current outpatient prescriptions on file  No current facility-administered medications for this visit  Allergies  No Known Allergies    Past Medical History, Social History, Family History, medications and allergies were reviewed  Review of Systems  Review of Systems   Constitutional: Negative  HENT: Negative  Eyes: Negative  Respiratory: Negative  Cardiovascular: Negative  Gastrointestinal: Negative  Endocrine: Negative  Genitourinary: Positive for hematuria  Negative for decreased urine volume, difficulty urinating and urgency  Musculoskeletal: Negative  Skin: Negative  Neurological: Negative  Hematological: Negative  Psychiatric/Behavioral: Negative  Vitals  Vitals:    09/12/18 1356   BP: 120/80   BP Location: Left arm   Patient Position: Sitting   Cuff Size: Adult   Pulse: 68   Weight: 93 4 kg (206 lb)   Height: 5' 9" (1 753 m)         Physical Exam    Physical Exam   Constitutional: He is oriented to person, place, and time  He appears well-developed and well-nourished  HENT:   Head: Normocephalic and atraumatic  Eyes: Pupils are equal, round, and reactive to light  Neck: Normal range of motion  Cardiovascular: Normal rate, regular rhythm and normal heart sounds  Pulmonary/Chest: Effort normal and breath sounds normal  No accessory muscle usage  No respiratory distress  Abdominal: Soft  Normal appearance and bowel sounds are normal  There is no tenderness     Musculoskeletal: Normal range of motion  Neurological: He is alert and oriented to person, place, and time  Skin: Skin is warm, dry and intact  Psychiatric: He has a normal mood and affect  His speech is normal  Cognition and memory are normal    Nursing note and vitals reviewed        Results    Below listed labs, pathology results, and radiology images were personally reviewed:    No results found for: PSA  Lab Results   Component Value Date    CALCIUM 9 0 05/05/2018     05/05/2018    K 4 1 05/05/2018    CO2 27 05/05/2018     05/05/2018    BUN 16 05/05/2018    CREATININE 1 11 05/05/2018     Lab Results   Component Value Date    WBC 7 57 10/27/2017    HGB 16 2 10/27/2017    HCT 46 5 10/27/2017    MCV 89 10/27/2017     10/27/2017       No results found for this or any previous visit (from the past 1 hour(s)) ]

## 2018-09-12 ENCOUNTER — OFFICE VISIT (OUTPATIENT)
Dept: UROLOGY | Facility: AMBULATORY SURGERY CENTER | Age: 38
End: 2018-09-12

## 2018-09-12 VITALS
HEART RATE: 68 BPM | WEIGHT: 206 LBS | BODY MASS INDEX: 30.51 KG/M2 | HEIGHT: 69 IN | DIASTOLIC BLOOD PRESSURE: 80 MMHG | SYSTOLIC BLOOD PRESSURE: 120 MMHG

## 2018-09-12 DIAGNOSIS — N13.30 HYDRONEPHROSIS OF RIGHT KIDNEY: Primary | ICD-10-CM

## 2018-09-12 DIAGNOSIS — N20.1 CALCULUS OF URETER: ICD-10-CM

## 2018-09-12 PROCEDURE — 99024 POSTOP FOLLOW-UP VISIT: CPT | Performed by: UROLOGY

## 2018-09-28 NOTE — PROGRESS NOTES
Cystoscopy  Date/Time: 10/1/2018 3:15 PM  Performed by: Zaina Dumont by: Amena Mercedes     Procedure details: simple removal of a foreign body, stone, or stent    Patient tolerance: Patient tolerated the procedure well with no immediate complications          Remo Fothergill is a 43 y o  male seen today in regards to a history of fairly extensive history of renal lithiasis on the right side  He had a stent removed and his previous ultrasound showed severe right hydronephrosis   Subsequently developed flank pain   He was thought to perhaps have a distal stone versus a ureteral stricture and had been advised to consider repeat ureteroscopy  Chente Man presents today for further evaluation now having passed a stone approximately 2 weeks ago  [place] notes only intermittent right flank discomfort   No fever or chills   Denies any urinary complaints other than nocturia x2   No gross hematuria  Alley Cole is a strong family history of nephrolithiasis  Isacc [place] stones were calcium oxalate   A CT scan was obtained which in fact confirms moderate hydronephrosis to the level of the mid ureter where there is tapering and a punctate calcification seen   Distal ureter otherwise appears normal   He continues to have persistent mild right flank pain  Status post cystoscopy and right stent insertion for stricture  Tolerating stent reasonably well with occasional hematuria  Operative findings discussed with patient and wife  PROCEDURE- CYSTOSCOPY and STENT REMOVAL    After informed consent including the risks of bleeding, infection, ureteral injury, recurrent colic, the patient was placed supine in the procedure suite  Patient was sterilely prepped and draped in the usual fashion  10 cc intravesical lidocaine was administered  Flexible cystoscopy was performed  No abnormal findings were seen  The stent was grasped and retrieved intact  Patient tolerated the procedure well    He will return in 6 months with a renal bladder ultrasound prior to visit

## 2018-10-01 ENCOUNTER — PROCEDURE VISIT (OUTPATIENT)
Dept: UROLOGY | Facility: AMBULATORY SURGERY CENTER | Age: 38
End: 2018-10-01
Payer: COMMERCIAL

## 2018-10-01 VITALS — HEIGHT: 69 IN | BODY MASS INDEX: 30.42 KG/M2

## 2018-10-01 DIAGNOSIS — N13.30 HYDRONEPHROSIS OF RIGHT KIDNEY: Primary | ICD-10-CM

## 2018-10-01 LAB
SL AMB  POCT GLUCOSE, UA: NORMAL
SL AMB LEUKOCYTE ESTERASE,UA: NORMAL
SL AMB POCT BILIRUBIN,UA: NORMAL
SL AMB POCT BLOOD,UA: NORMAL
SL AMB POCT CLARITY,UA: NORMAL
SL AMB POCT COLOR,UA: YELLOW
SL AMB POCT KETONES,UA: NORMAL
SL AMB POCT NITRITE,UA: NORMAL
SL AMB POCT PH,UA: 6
SL AMB POCT SPECIFIC GRAVITY,UA: 1.01
SL AMB POCT URINE PROTEIN: NORMAL
SL AMB POCT UROBILINOGEN: NORMAL

## 2018-10-01 PROCEDURE — 81002 URINALYSIS NONAUTO W/O SCOPE: CPT | Performed by: UROLOGY

## 2018-10-01 PROCEDURE — 52310 CYSTOSCOPY AND TREATMENT: CPT | Performed by: UROLOGY

## 2019-01-07 ENCOUNTER — HOSPITAL ENCOUNTER (OUTPATIENT)
Dept: RADIOLOGY | Facility: HOSPITAL | Age: 39
Discharge: HOME | End: 2019-01-07
Attending: UROLOGY
Payer: COMMERCIAL

## 2019-01-07 ENCOUNTER — TRANSCRIBE ORDERS (OUTPATIENT)
Dept: RADIOLOGY | Facility: HOSPITAL | Age: 39
End: 2019-01-07

## 2019-01-07 DIAGNOSIS — N13.30 HYDRONEPHROSIS OF RIGHT KIDNEY: ICD-10-CM

## 2019-01-07 PROCEDURE — 76770 US EXAM ABDO BACK WALL COMP: CPT

## 2019-04-10 ENCOUNTER — OFFICE VISIT (OUTPATIENT)
Dept: UROLOGY | Facility: AMBULATORY SURGERY CENTER | Age: 39
End: 2019-04-10
Payer: COMMERCIAL

## 2019-04-10 VITALS
HEIGHT: 69 IN | HEART RATE: 74 BPM | DIASTOLIC BLOOD PRESSURE: 76 MMHG | SYSTOLIC BLOOD PRESSURE: 124 MMHG | WEIGHT: 210.2 LBS | BODY MASS INDEX: 31.13 KG/M2

## 2019-04-10 DIAGNOSIS — N13.30 HYDRONEPHROSIS OF RIGHT KIDNEY: Primary | ICD-10-CM

## 2019-04-10 PROCEDURE — 99213 OFFICE O/P EST LOW 20 MIN: CPT | Performed by: UROLOGY

## 2019-04-10 RX ORDER — CYCLOBENZAPRINE HCL 5 MG
TABLET ORAL DAILY
COMMUNITY
Start: 2019-01-23 | End: 2020-02-06 | Stop reason: ALTCHOICE

## 2019-10-10 ENCOUNTER — HOSPITAL ENCOUNTER (OUTPATIENT)
Dept: RADIOLOGY | Facility: HOSPITAL | Age: 39
Discharge: HOME | End: 2019-10-10
Attending: UROLOGY
Payer: COMMERCIAL

## 2019-10-10 ENCOUNTER — TRANSCRIBE ORDERS (OUTPATIENT)
Dept: RADIOLOGY | Facility: HOSPITAL | Age: 39
End: 2019-10-10

## 2019-10-10 DIAGNOSIS — N13.30 HYDRONEPHROSIS OF RIGHT KIDNEY: ICD-10-CM

## 2019-10-10 PROCEDURE — 74176 CT ABD & PELVIS W/O CONTRAST: CPT

## 2019-10-21 ENCOUNTER — OFFICE VISIT (OUTPATIENT)
Dept: UROLOGY | Facility: AMBULATORY SURGERY CENTER | Age: 39
End: 2019-10-21
Payer: COMMERCIAL

## 2019-10-21 VITALS
DIASTOLIC BLOOD PRESSURE: 62 MMHG | SYSTOLIC BLOOD PRESSURE: 120 MMHG | BODY MASS INDEX: 32.14 KG/M2 | WEIGHT: 217 LBS | HEIGHT: 69 IN | HEART RATE: 75 BPM

## 2019-10-21 DIAGNOSIS — N20.0 NEPHROLITHIASIS: Primary | ICD-10-CM

## 2019-10-21 PROCEDURE — 99214 OFFICE O/P EST MOD 30 MIN: CPT | Performed by: NURSE PRACTITIONER

## 2019-10-21 RX ORDER — ONDANSETRON 4 MG/1
4 TABLET, FILM COATED ORAL EVERY 8 HOURS PRN
Qty: 20 TABLET | Refills: 0 | Status: SHIPPED | OUTPATIENT
Start: 2019-10-21 | End: 2019-11-20 | Stop reason: CLARIF

## 2019-10-21 RX ORDER — PHENAZOPYRIDINE HYDROCHLORIDE 100 MG/1
100 TABLET, FILM COATED ORAL 3 TIMES DAILY PRN
Qty: 10 TABLET | Refills: 0 | Status: SHIPPED | OUTPATIENT
Start: 2019-10-21 | End: 2020-02-06 | Stop reason: ALTCHOICE

## 2019-10-21 RX ORDER — TAMSULOSIN HYDROCHLORIDE 0.4 MG/1
0.4 CAPSULE ORAL
Qty: 14 CAPSULE | Refills: 0 | Status: SHIPPED | OUTPATIENT
Start: 2019-10-21 | End: 2019-10-30

## 2019-10-21 RX ORDER — OXYBUTYNIN CHLORIDE 5 MG/1
5 TABLET ORAL 3 TIMES DAILY PRN
Qty: 30 TABLET | Refills: 0 | Status: SHIPPED | OUTPATIENT
Start: 2019-10-21 | End: 2020-02-06 | Stop reason: ALTCHOICE

## 2019-10-21 NOTE — PROGRESS NOTES
10/21/2019      Chief Complaint   Patient presents with    Hydronephrosis     Assessment and Plan    35 y o  male managed by Dr Delmar Restrepo    1  Nephrolithiasis   · Zofran, oxybutynin, Pyridium prescriptions provided  · Ultrasound of kidney and bladder and KUB ordered for 2 weeks  · Urinalysis with microscopy and urine culture ordered  · Strain urine  · ER precautions discussed  · Dietary behavior modifications discussed to assist in passage of stone-increase water intake upwards to 60 oz  · Decrease/limit the number cough, soda, teas intake      History of Present Illness  Sb Campbell is a 35 y o  male here for follow up evaluation of  nephrolithiasis  Patient presents to the office with right upper quadrant right flank pain  On 10/10/2019 CT renal study is performed and patient was noted to have mild right-sided hydronephrosis of the collecting systems are with a 2 mm stable calculi in the right renal pelvis  He was also noted to have a stable left-sided 2 mm stone in the upper and lower poles with no hydronephrosis noted  He continues to complain of right-sided flank cleaning at this facility nausea and occasional urinary frequency  Denies fever and chills  We discussed the need to repeat imaging of with an ultrasound of the kidney bladder and the p r n  Patient will attempt medical expulsive therapy for approximately 2 weeks prior with reimaging to occur prior to his next office visit  He is agreeable with this plan  Review of Systems   Constitutional: Negative for chills and fever  Respiratory: Negative for cough and shortness of breath  Cardiovascular: Negative for chest pain  Gastrointestinal: Positive for nausea  Negative for abdominal distention, abdominal pain, blood in stool and vomiting  Genitourinary: Positive for flank pain and frequency  Negative for difficulty urinating, dysuria, enuresis, hematuria and urgency  Skin: Negative for rash     Neurological: Negative for dizziness  AUA SYMPTOM SCORE      Most Recent Value   AUA SYMPTOM SCORE   How often have you had a sensation of not emptying your bladder completely after you finished urinating? 0   How often have you had to urinate again less than two hours after you finished urinating? 0   How often have you found you stopped and started again several times when you urinate?  0   How often have you found it difficult to postpone urination? 0   How often have you had a weak urinary stream?  0   How often have you had to push or strain to begin urination? 0   How many times did you most typically get up to urinate from the time you went to bed at night until the time you got up in the morning?   2   Quality of Life: If you were to spend the rest of your life with your urinary condition just the way it is now, how would you feel about that?  3   AUA SYMPTOM SCORE  2         Past Medical History  Past Medical History:   Diagnosis Date    Chronic kidney disease     KIDNEY STONES    Kidney stone        Past Social History  Past Surgical History:   Procedure Laterality Date    CYSTOSCOPY W/ LASER LITHOTRIPSY Right 8/24/2017    Procedure: CYSTOSCOPY URETEROSCOPY WITH LITHOTRIPSY HOLMIUM LASER, RETROGRADE PYELOGRAM AND INSERTION STENT URETERAL;  Surgeon: Fidel Garrett MD;  [facility];  Service: Urology    CYSTOSCOPY W/ LASER LITHOTRIPSY Right 9/14/2017    Procedure: CYSTOSCOPY; RETROGRADE PYELOGRAM; URETEROSCOPY WITH HOLMIUM LASER; stone extraction ,EXCHANGE OF RIGHT URETERAL STENT;  Surgeon: Fidel Garrett MD;  [facility];  Service: Urology    EXTRACORPOREAL SHOCK WAVE LITHOTRIPSY Right 8/10/2017    Procedure: Louann Ronde SHOCKWAVE (ESWL) WITH CYSTOSCOPY AND INSERTION STENT URETERAL;  Surgeon: Fidel Garrett MD;  [facility];  Service: Urology    FL RETROGRADE PYELOGRAM  8/17/2018    RI CYSTO/URETERO W/LITHOTRIPSY &INDWELL STENT INSRT Right 8/17/2018    Procedure: CYSTOSCOPY URETEROSCOPY, RETROGRADE PYELOGRAM AND INSERTION STENT URETERAL;  Surgeon: Jose R Gonzalez MD;  [facility];  Service: Urology    ND CYSTOURETHROSCOPY,URETER CATHETER Right 11/9/2017    Procedure: CYSTOSCOPY RETROGRADE PYELOGRAM WITH URETEROSCOPY WITH LASER AND RIGHT STENT EXCHANGE;  Surgeon: Hardik Salguero MD;  [facility];  Service: Urology    ND CYSTOURETHROSCOPY,URETER CATHETER N/A 1/4/2018    Procedure: CYSTOSCOPY, BILATERAL RETROGRADE PYELOGRAM WITH RIGHT URETEROSCOPY WITH HYDRODILATION OF STRICTURE , RIGHT STENT INSERTION X 2;  Surgeon: Hardik Salguero MD;  [facility];  Service: Urology     Social History     Tobacco Use   Smoking Status Light Tobacco Smoker    Types: Cigars   Smokeless Tobacco Never Used   Tobacco Comment    CIGAR - 1-2 per month       Past Family History  Family History   Problem Relation Age of Onset    Kidney disease Mother     Diabetes Mother     Hypertension Mother     Urolithiasis Mother     No Known Problems Father        Past Social history  Social History     Socioeconomic History    Marital status: /Civil Union     Spouse name: Not on file    Number of children: Not on file    Years of education: Not on file    Highest education level: Not on file   Occupational History    Occupation: [employer]   Social Needs    Financial resource strain: Not on file    Food insecurity:     Worry: Not on file     Inability: Not on file   [employer] needs:     Medical: Not on file     Non-medical: Not on file   Tobacco Use    Smoking status: Light Tobacco Smoker     Types: Cigars    Smokeless tobacco: Never Used    Tobacco comment: CIGAR - 1-2 per month   Substance and Sexual Activity    Alcohol use: Yes     Comment: Socially     Drug use: No    Sexual activity: Not on file   Lifestyle    Physical activity:     Days per week: Not on file     Minutes per session: Not on file    Stress: Not on file   Relationships    Social connections:     Talks on phone: Not on file     Gets together: Not on file     Attends [employer] service: Not on file     Active member of club or organization: Not on file     Attends meetings of clubs or organizations: Not on file     Relationship status: Not on file    Intimate partner violence:     Fear of current or ex partner: Not on file     Emotionally abused: Not on file     Physically abused: Not on file     Forced sexual activity: Not on file   Other Topics Concern    Not on file   Social History Narrative    Not on file       Current Medications  Current Outpatient Medications   Medication Sig Dispense Refill    cyclobenzaprine (FLEXERIL) 5 mg tablet daily      ondansetron (ZOFRAN) 4 mg tablet Take 1 tablet (4 mg total) by mouth every 8 (eight) hours as needed for nausea or vomiting (Patient not taking: Reported on 10/30/2019) 20 tablet 0    oxybutynin (DITROPAN) 5 mg tablet Take 1 tablet (5 mg total) by mouth 3 (three) times a day as needed (urinary frequency) (Patient not taking: Reported on 10/30/2019) 30 tablet 0    phenazopyridine (PYRIDIUM) 100 mg tablet Take 1 tablet (100 mg total) by mouth 3 (three) times a day as needed for bladder spasms (Patient not taking: Reported on 10/30/2019) 10 tablet 0    tamsulosin (FLOMAX) 0 4 mg Take 1 capsule (0 4 mg total) by mouth daily with dinner 14 capsule 0     No current facility-administered medications for this visit  Allergies  No Known Allergies      The following portions of the patient's history were reviewed and updated as appropriate: allergies, current medications, past medical history, past social history, past surgical history and problem list       Vitals  Vitals:    10/21/19 1544   BP: 120/62   BP Location: Left arm   Patient Position: Sitting   Cuff Size: Adult   Pulse: 75   Weight: 98 4 kg (217 lb)   Height: 5' 9" (1 753 m)     Physical Exam  Physical Exam   Constitutional: He is oriented to person, place, and time   He appears well-developed and well-nourished  He appears distressed  HENT:   Head: Atraumatic  Cardiovascular: Normal rate, regular rhythm and normal heart sounds  No murmur heard  Pulmonary/Chest: Effort normal and breath sounds normal  No respiratory distress  Abdominal: Soft  Musculoskeletal: Normal range of motion  Neurological: He is alert and oriented to person, place, and time  Skin: Skin is warm and dry  Psychiatric: He has a normal mood and affect  Vitals reviewed  Results  No results found for this or any previous visit (from the past 1 hour(s))  ]  No results found for: PSA  Lab Results   Component Value Date    CALCIUM 9 0 05/05/2018    K 4 1 05/05/2018    CO2 27 05/05/2018     05/05/2018    BUN 16 05/05/2018    CREATININE 1 11 05/05/2018     Lab Results   Component Value Date    WBC 7 57 10/27/2017    HGB 16 2 10/27/2017    HCT 46 5 10/27/2017    MCV 89 10/27/2017     10/27/2017     Orders  Orders Placed This Encounter   Procedures    Urine culture    XR abdomen 1 view kub     Nephrolithiasis     Standing Status:   Future     Number of Occurrences:   1     Standing Expiration Date:   10/21/2023     Scheduling Instructions:      Bring along any outside films relating to this procedure   US kidney and bladder     Standing Status:   Future     Number of Occurrences:   1     Standing Expiration Date:   10/21/2023     Scheduling Instructions:      "Prep required if being scheduled in conjunction with other studies, refer to those examination's Preps first before scheduling  All patients for US Kidney and Bladder they must drink 24 oz of water 60 minutes before your scheduled appointment time  This test requires you to have a FULL bladder  Please do not urinate before your test             Please bring your physician order, insurance cards, a form of photo ID and a list of your medications with you   Arrive 15 minutes prior to your appointment time in order to register              If you need to have lab work or a urinalysis, please do this AFTER your ultrasound  "     Order Specific Question:   Reason for Exam:     Answer:   nephrolithiasis    Urinalysis with microscopic       EVERETT Wilks

## 2019-10-21 NOTE — PATIENT INSTRUCTIONS
Abdominal xray and Ultrasound to be repeated in 2 weeks  Flomax, Ditropan, Zofran, Pyridium as directed  Increase the amount of water intake upwards to 60 oz per day  Limit the amount of coffee, soda and tea intake per day  Call the office for concerning symptoms- fever, chills, inability to urinate, uncontrolled pain

## 2019-10-28 ENCOUNTER — HOSPITAL ENCOUNTER (OUTPATIENT)
Dept: RADIOLOGY | Facility: HOSPITAL | Age: 39
Discharge: HOME | End: 2019-10-28
Payer: COMMERCIAL

## 2019-10-28 DIAGNOSIS — N20.0 NEPHROLITHIASIS: ICD-10-CM

## 2019-10-28 PROCEDURE — 76770 US EXAM ABDO BACK WALL COMP: CPT

## 2019-10-28 PROCEDURE — 74018 RADEX ABDOMEN 1 VIEW: CPT

## 2019-10-30 ENCOUNTER — OFFICE VISIT (OUTPATIENT)
Dept: UROLOGY | Facility: AMBULATORY SURGERY CENTER | Age: 39
End: 2019-10-30
Payer: COMMERCIAL

## 2019-10-30 ENCOUNTER — TELEPHONE (OUTPATIENT)
Dept: OTHER | Facility: HOSPITAL | Age: 39
End: 2019-10-30

## 2019-10-30 VITALS — HEIGHT: 69 IN | BODY MASS INDEX: 31.99 KG/M2 | WEIGHT: 216 LBS

## 2019-10-30 DIAGNOSIS — N20.0 NEPHROLITHIASIS: Primary | ICD-10-CM

## 2019-10-30 PROCEDURE — 99214 OFFICE O/P EST MOD 30 MIN: CPT | Performed by: NURSE PRACTITIONER

## 2019-10-30 RX ORDER — TAMSULOSIN HYDROCHLORIDE 0.4 MG/1
0.4 CAPSULE ORAL
Qty: 14 CAPSULE | Refills: 0 | Status: SHIPPED | OUTPATIENT
Start: 2019-10-30 | End: 2020-02-06 | Stop reason: ALTCHOICE

## 2019-10-30 NOTE — PATIENT INSTRUCTIONS
Flomax as directed  Take the other medications previously ordered if the pain increases as the stone moves  Increase the amount of water per day upwards to 90 oz per day  Avoid/limit soda, coffee, tea, spinach, kale and strawberry intake

## 2019-10-30 NOTE — PROGRESS NOTES
10/30/2019    ·   Chief Complaint   Patient presents with    Nephrolithiasis     Assessment and Plan    35 y o  male managed by Dr Batool Rahman    1  Nephrolithiasis  · Ultrasound of kidney and bladder performed 10/28/2019 reveals persistent right hydronephrosis with a proximal right ureteral calculi measuring approximately 3 mm  · Tamsulosin 0 4 mg p o  Daily  · Continue with medications as previously ordered for medical expulsive therapy  · Increased amount of water intake upwards to 60 oz per day  · CT abdomen pelvis stone study to be performed-ordered  · Reiterated ER precautions      2  Hydronephrosis  · Secondary to nephrolithiasis as above    History of Present Illness  Charline Vila is a 35 y o  male here for follow upnephrolithiasis  Patient presents to the office with right upper quadrant right flank pain  On 10/10/2019 CT renal study is performed and patient was noted to have mild right-sided hydronephrosis of the collecting systems are with a 2 mm stable calculi in the right renal pelvis  He was also noted to have a stable left-sided 2 mm stone in the upper and lower poles with no hydronephrosis noted  He continues to complain of right-sided flank cleaning at this facility nausea and occasional urinary frequency  Denies fever and chills  We discussed the need to repeat imaging of with an ultrasound of the kidney bladder and KUB which resulted as a 3 mm calculi in the right proximal ureter with resultant hydronephrosis  Patient will attempt medical expulsive therapy for approximately 2 weeks prior with reimaging to occur prior to his next office visit  He is agreeable with this plan  Review of Systems   Constitutional: Negative for chills and fever  Respiratory: Negative for cough and shortness of breath  Cardiovascular: Negative for chest pain  Gastrointestinal: Negative for abdominal distention, abdominal pain, blood in stool, nausea and vomiting     Genitourinary: Positive for flank pain  Negative for difficulty urinating, dysuria, enuresis, frequency, hematuria and urgency  Musculoskeletal: Negative for back pain  Skin: Negative for rash  Urinary Incontinence Screening      Most Recent Value   Urinary Incontinence   Urinary Incontinence? No   Incomplete emptying? Yes   Urinary frequency? Yes   Urinary urgency? No   Urinary hesitancy? Yes   Dysuria (painful difficult urination)? No   Nocturia (waking up to use the bathroom)? Yes [3x]   Straining (having to push to go)?   Yes   Weak stream?  No   Intermittent stream?  No          Past Medical History  Past Medical History:   Diagnosis Date    Chronic kidney disease     KIDNEY STONES    Kidney stone        Past Social History  Past Surgical History:   Procedure Laterality Date    CYSTOSCOPY W/ LASER LITHOTRIPSY Right 8/24/2017    Procedure: CYSTOSCOPY URETEROSCOPY WITH LITHOTRIPSY HOLMIUM LASER, RETROGRADE PYELOGRAM AND INSERTION STENT URETERAL;  Surgeon: Jose Mckeon MD;  [facility];  Service: Urology    CYSTOSCOPY W/ LASER LITHOTRIPSY Right 9/14/2017    Procedure: CYSTOSCOPY; RETROGRADE PYELOGRAM; URETEROSCOPY WITH HOLMIUM LASER; stone extraction ,EXCHANGE OF RIGHT URETERAL STENT;  Surgeon: Jose Mckeon MD;  [facility];  Service: Urology    EXTRACORPOREAL SHOCK WAVE LITHOTRIPSY Right 8/10/2017    Procedure: Guero Bohr SHOCKWAVE (ESWL) WITH CYSTOSCOPY AND INSERTION STENT URETERAL;  Surgeon: Jose Mckeon MD;  [facility];  Service: Urology    FL RETROGRADE PYELOGRAM  8/17/2018    [place] CYSTO/URETERO W/LITHOTRIPSY &INDWELL STENT INSRT Right 8/17/2018    Procedure: CYSTOSCOPY URETEROSCOPY, RETROGRADE PYELOGRAM AND INSERTION STENT URETERAL;  Surgeon: Tariq Smith MD;  [facility];  Service: Urology    IA CYSTOURETHROSCOPY,URETER CATHETER Right 11/9/2017    Procedure: CYSTOSCOPY RETROGRADE PYELOGRAM WITH URETEROSCOPY WITH LASER AND RIGHT STENT EXCHANGE;  Surgeon: Rosibel Everett MD;  [facility];  Service: Urology    [place] CYSTOURETHROSCOPY,URETER CATHETER N/A 1/4/2018    Procedure: CYSTOSCOPY, BILATERAL RETROGRADE PYELOGRAM WITH RIGHT URETEROSCOPY WITH HYDRODILATION OF STRICTURE , RIGHT STENT INSERTION X 2;  Surgeon: Rosibel Everett MD;  [facility];  Service: Urology     Social History     Tobacco Use   Smoking Status Light Tobacco Smoker    Types: Cigars   Smokeless Tobacco Never Used   Tobacco Comment    CIGAR - 1-2 per month       Past Family History  Family History   Problem Relation Age of Onset    Kidney disease Mother     Diabetes Mother    [facility] Hypertension Mother     Urolithiasis Mother     No Known Problems Father        Past Social history  Social History     Socioeconomic History    Marital status: /Civil Union     Spouse name: Not on file    Number of children: Not on file    Years of education: Not on file    Highest education level: Not on file   Occupational History    Occupation: [employer]   Social Needs    Financial resource strain: Not on file    Food insecurity:     Worry: Not on file     Inability: Not on file   datatracker needs:     Medical: Not on file     Non-medical: Not on file   Tobacco Use    Smoking status: Light Tobacco Smoker     Types: Cigars    Smokeless tobacco: Never Used    Tobacco comment: CIGAR - 1-2 per month   Substance and Sexual Activity    Alcohol use: Yes     Comment: Socially     Drug use: No    Sexual activity: Not on file   Lifestyle    Physical activity:     Days per week: Not on file     Minutes per session: Not on file    Stress: Not on file   Relationships    Social connections:     Talks on phone: Not on file     Gets together: Not on file     Attends [employer] service: Not on file     Active member of club or organization: Not on file     Attends meetings of clubs or organizations: Not on file     Relationship status: Not on file    Intimate partner violence:     Fear of current or ex partner: Not on file     Emotionally abused: Not on file     Physically abused: Not on file     Forced sexual activity: Not on file   Other Topics Concern    Not on file   Social History Narrative    Not on file       Current Medications  Current Outpatient Medications   Medication Sig Dispense Refill    cyclobenzaprine (FLEXERIL) 5 mg tablet daily      ondansetron (ZOFRAN) 4 mg tablet Take 1 tablet (4 mg total) by mouth every 8 (eight) hours as needed for nausea or vomiting (Patient not taking: Reported on 10/30/2019) 20 tablet 0    oxybutynin (DITROPAN) 5 mg tablet Take 1 tablet (5 mg total) by mouth 3 (three) times a day as needed (urinary frequency) (Patient not taking: Reported on 10/30/2019) 30 tablet 0    phenazopyridine (PYRIDIUM) 100 mg tablet Take 1 tablet (100 mg total) by mouth 3 (three) times a day as needed for bladder spasms (Patient not taking: Reported on 10/30/2019) 10 tablet 0    tamsulosin (FLOMAX) 0 4 mg Take 1 capsule (0 4 mg total) by mouth daily with dinner 14 capsule 0     No current facility-administered medications for this visit  Allergies  No Known Allergies      The following portions of the patient's history were reviewed and updated as appropriate: allergies, current medications, past medical history, past social history, past surgical history and problem list       Vitals  Vitals:    10/30/19 1646   Weight: 98 kg (216 lb)   Height: 5' 9" (1 753 m)     Physical Exam  Physical Exam   Constitutional: He is oriented to person, place, and time  He appears well-developed and well-nourished  HENT:   Head: Atraumatic  Eyes: EOM are normal    Neck: Neck supple  Cardiovascular: Normal rate  Pulmonary/Chest: Effort normal    Abdominal: Soft  There is no CVA tenderness  Musculoskeletal: Normal range of motion  Neurological: He is alert and oriented to person, place, and time  Skin: Skin is warm and dry  Psychiatric: He has a normal mood and affect  Vitals reviewed  Results  No results found for this or any previous visit (from the past 1 hour(s))  ]  No results found for: PSA  Lab Results   Component Value Date    CALCIUM 9 0 05/05/2018    K 4 1 05/05/2018    CO2 27 05/05/2018     05/05/2018    BUN 16 05/05/2018    CREATININE 1 11 05/05/2018     Lab Results   Component Value Date    WBC 7 57 10/27/2017    HGB 16 2 10/27/2017    HCT 46 5 10/27/2017    MCV 89 10/27/2017     10/27/2017     Orders  Orders Placed This Encounter   Procedures    CT renal stone study abdomen pelvis wo contrast     Standing Status:   Future     Standing Expiration Date:   10/30/2023     Scheduling Instructions:      Nothing to eat 3 hours prior to your test   Clear liquids are also permitted up until the time of the scan  Clear liquids include water, black coffee or tea, apple juice or clear broth  If possible wear clothing without any metal in the abdomen area  Sweat suit, shorts, sports bra or bra without underwire may eliminate the need to change  Please bring your insurance cards, a form of photo ID and a list of your medications with you  Arrive 15 minutes prior to your appointment time in order to register  On the day of your test, please bring any prior CT or MRI studies of this area with you that were not performed at a [facility]  To schedule this appointment, please contact Central Scheduling at [phone]  Order Specific Question:   What is the patient's sedation requirement?      Answer:   No Sedation       EVERETT Tillman

## 2019-10-30 NOTE — TELEPHONE ENCOUNTER
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

## 2019-11-13 ENCOUNTER — TRANSCRIBE ORDERS (OUTPATIENT)
Dept: RADIOLOGY | Facility: HOSPITAL | Age: 39
End: 2019-11-13

## 2019-11-13 ENCOUNTER — HOSPITAL ENCOUNTER (OUTPATIENT)
Dept: RADIOLOGY | Facility: HOSPITAL | Age: 39
Discharge: HOME | End: 2019-11-13
Payer: COMMERCIAL

## 2019-11-13 DIAGNOSIS — N20.0 NEPHROLITHIASIS: ICD-10-CM

## 2019-11-13 PROCEDURE — 74176 CT ABD & PELVIS W/O CONTRAST: CPT

## 2019-11-20 ENCOUNTER — OFFICE VISIT (OUTPATIENT)
Dept: UROLOGY | Facility: AMBULATORY SURGERY CENTER | Age: 39
End: 2019-11-20
Payer: COMMERCIAL

## 2019-11-20 VITALS
SYSTOLIC BLOOD PRESSURE: 124 MMHG | DIASTOLIC BLOOD PRESSURE: 70 MMHG | HEART RATE: 70 BPM | HEIGHT: 69 IN | BODY MASS INDEX: 31.76 KG/M2 | WEIGHT: 214.4 LBS

## 2019-11-20 DIAGNOSIS — N20.0 NEPHROLITHIASIS: Primary | ICD-10-CM

## 2019-11-20 PROCEDURE — 99213 OFFICE O/P EST LOW 20 MIN: CPT | Performed by: NURSE PRACTITIONER

## 2019-11-20 NOTE — PROGRESS NOTES
11/20/2019    Chief Complaint   Patient presents with    Nephrolithiasis     Assessment and Plan    35 y o  male managed by Dr Jacky Barrow    1  Nephrolithiasis  · CT of abdomen pelvis performed 11/13/2019 shows resolution ureteral stone with no hydronephrosis  Patient does have a 3 mm stone in the right renal collecting system  · Behavior and dietary modifications discussed  · Maintain adequate fluid hydration upwards to 60-80 oz per day  · Ultrasound kidney and bladder and KUB to be performed 1 year  · ER precautions discussed  · Follow up in the office in 1 year    History of Present Illness  Gabriel Copp is a 35 y o  male here for follow up evaluation of nephrolithiasis  Mary Rogers presents to the office with right upper quadrant right flank pain   On 10/10/2019 CT renal study is performed and patient was noted to have mild right-sided hydronephrosis of the collecting systems are with a 2 mm stable calculi in the right renal pelvis   He was also noted to have a stable left-sided 2 mm stone in the upper and lower poles with no hydronephrosis noted  Sanjuanita Schmidt continues to complain of right-sided flank cleaning at this facility nausea and occasional urinary frequency   Denies fever and chills   We discussed the need to repeat imaging of with an ultrasound of the kidney bladder and KUB which resulted as a 3 mm calculi in the right proximal ureter with resultant hydronephrosis  Review of Systems   Constitutional: Negative for chills and fever  Respiratory: Negative for cough and shortness of breath  Cardiovascular: Negative for chest pain  Gastrointestinal: Negative for abdominal distention, abdominal pain, blood in stool, nausea and vomiting  Genitourinary: Negative for difficulty urinating, dysuria, enuresis, flank pain, frequency, hematuria and urgency  Musculoskeletal: Negative for back pain  Skin: Negative for rash  Neurological: Negative for dizziness       Past Medical History  Past Medical History:   Diagnosis Date    Chronic kidney disease     KIDNEY STONES    Kidney stone        Past Social History  Past Surgical History:   Procedure Laterality Date    CYSTOSCOPY W/ LASER LITHOTRIPSY Right 8/24/2017    Procedure: CYSTOSCOPY URETEROSCOPY WITH LITHOTRIPSY HOLMIUM LASER, RETROGRADE PYELOGRAM AND INSERTION STENT URETERAL;  Surgeon: Tawanda Tsai MD;  [facility];  Service: Urology    CYSTOSCOPY W/ LASER LITHOTRIPSY Right 9/14/2017    Procedure: CYSTOSCOPY; RETROGRADE PYELOGRAM; URETEROSCOPY WITH HOLMIUM LASER; stone extraction ,EXCHANGE OF RIGHT URETERAL STENT;  Surgeon: Tawanda Tsai MD;  [facility];  Service: Urology    EXTRACORPOREAL SHOCK WAVE LITHOTRIPSY Right 8/10/2017    Procedure: Dave Hark SHOCKWAVE (ESWL) WITH CYSTOSCOPY AND INSERTION STENT URETERAL;  Surgeon: Tawanda Tsai MD;  [facility];  Service: Urology    FL RETROGRADE PYELOGRAM  8/17/2018    NC CYSTO/URETERO W/LITHOTRIPSY &INDWELL STENT INSRT Right 8/17/2018    Procedure: CYSTOSCOPY URETEROSCOPY, RETROGRADE PYELOGRAM AND INSERTION STENT URETERAL;  Surgeon: Esteban Bryan MD;  [facility];  Service: Urology    NC CYSTOURETHROSCOPY,URETER CATHETER Right 11/9/2017    Procedure: CYSTOSCOPY RETROGRADE PYELOGRAM WITH URETEROSCOPY WITH LASER AND RIGHT STENT EXCHANGE;  Surgeon: Tawanda Tsai MD;  [facility];  Service: Urology    NC CYSTOURETHROSCOPY,URETER CATHETER N/A 1/4/2018    Procedure: CYSTOSCOPY, BILATERAL RETROGRADE PYELOGRAM WITH RIGHT URETEROSCOPY WITH HYDRODILATION OF STRICTURE , RIGHT STENT INSERTION X 2;  Surgeon: Tawanda Tsai MD;  [facility];  Service: Urology     Social History     Tobacco Use   Smoking Status Light Tobacco Smoker    Types: Cigars   Smokeless Tobacco Never Used   Tobacco Comment    CIGAR - 1-2 per month       Past Family History  Family History   Problem Relation Age of Onset    Kidney disease Mother     Diabetes Mother  Hypertension Mother     Urolithiasis Mother     No Known Problems Father        Past Social history  Social History     Socioeconomic History    Marital status: /Civil Union     Spouse name: Not on file    Number of children: Not on file    Years of education: Not on file    Highest education level: Not on file   Occupational History    Occupation: [employer]   Social Needs    Financial resource strain: Not on file    Food insecurity:     Worry: Not on file     Inability: Not on file   [place] needs:     Medical: Not on file     Non-medical: Not on file   Tobacco Use    Smoking status: Light Tobacco Smoker     Types: Cigars    Smokeless tobacco: Never Used    Tobacco comment: CIGAR - 1-2 per month   Substance and Sexual Activity    Alcohol use: Yes     Comment: Socially     Drug use: No    Sexual activity: Not on file   Lifestyle    Physical activity:     Days per week: Not on file     Minutes per session: Not on file    Stress: Not on file   Relationships    Social connections:     Talks on phone: Not on file     Gets together: Not on file     Attends Christian service: Not on file     Active member of club or organization: Not on file     Attends meetings of clubs or organizations: Not on file     Relationship status: Not on file    Intimate partner violence:     Fear of current or ex partner: Not on file     Emotionally abused: Not on file     Physically abused: Not on file     Forced sexual activity: Not on file   Other Topics Concern    Not on file   Social History Narrative    Not on file       Current Medications  Current Outpatient Medications   Medication Sig Dispense Refill    tamsulosin (FLOMAX) 0 4 mg Take 1 capsule (0 4 mg total) by mouth daily with dinner 14 capsule 0    cyclobenzaprine (FLEXERIL) 5 mg tablet daily      oxybutynin (DITROPAN) 5 mg tablet Take 1 tablet (5 mg total) by mouth 3 (three) times a day as needed (urinary frequency) (Patient not taking: Reported on 10/30/2019) 30 tablet 0    phenazopyridine (PYRIDIUM) 100 mg tablet Take 1 tablet (100 mg total) by mouth 3 (three) times a day as needed for bladder spasms (Patient not taking: Reported on 10/30/2019) 10 tablet 0     No current facility-administered medications for this visit  Allergies  No Known Allergies      The following portions of the patient's history were reviewed and updated as appropriate: allergies, current medications, past medical history, past social history, past surgical history and problem list       Vitals  Vitals:    11/20/19 1544   BP: 124/70   BP Location: Right arm   Patient Position: Sitting   Cuff Size: Adult   Pulse: 70   Weight: 97 3 kg (214 lb 6 4 oz)   Height: 5' 9" (1 753 m)     Physical Exam  Physical Exam   Constitutional: He is oriented to person, place, and time  He appears well-developed and well-nourished  No distress  HENT:   Head: Atraumatic  Cardiovascular: Normal rate  Pulmonary/Chest: Effort normal    Abdominal: Soft  There is no CVA tenderness  Musculoskeletal: Normal range of motion  Neurological: He is alert and oriented to person, place, and time  Skin: Skin is warm and dry  Psychiatric: He has a normal mood and affect  Vitals reviewed  Results  No results found for this or any previous visit (from the past 1 hour(s))  ]  No results found for: PSA  Lab Results   Component Value Date    CALCIUM 9 0 05/05/2018    K 4 1 05/05/2018    CO2 27 05/05/2018     05/05/2018    BUN 16 05/05/2018    CREATININE 1 11 05/05/2018     Lab Results   Component Value Date    WBC 7 57 10/27/2017    HGB 16 2 10/27/2017    HCT 46 5 10/27/2017    MCV 89 10/27/2017     10/27/2017     Orders  Orders Placed This Encounter   Procedures    XR abdomen 1 view kub     Nephrolithiasis     Standing Status:   Future     Standing Expiration Date:   11/20/2023     Scheduling Instructions:      Bring along any outside films relating to this procedure   US kidney and bladder     Standing Status:   Future     Standing Expiration Date:   11/20/2023     Scheduling Instructions:      "Prep required if being scheduled in conjunction with other studies, refer to those examination's Preps first before scheduling  All patients for US Kidney and Bladder they must drink 24 oz of water 60 minutes before your scheduled appointment time  This test requires you to have a FULL bladder  Please do not urinate before your test             Please bring your physician order, insurance cards, a form of photo ID and a list of your medications with you  Arrive 15 minutes prior to your appointment time in order to register              If you need to have lab work or a urinalysis, please do this AFTER your ultrasound  "     Order Specific Question:   Reason for Exam:     Answer:   nephrolithiasis       EVERETT Green

## 2019-11-20 NOTE — PATIENT INSTRUCTIONS
KUB and ultrasound kidney bladder be performed 1 year  Continue with keeping yourself hydrated- upwards to 60-80 oz per day  Call the office for concerning symptoms

## 2020-02-06 ENCOUNTER — OFFICE VISIT (OUTPATIENT)
Dept: UROLOGY | Facility: MEDICAL CENTER | Age: 40
End: 2020-02-06
Payer: COMMERCIAL

## 2020-02-06 VITALS
HEART RATE: 75 BPM | DIASTOLIC BLOOD PRESSURE: 60 MMHG | SYSTOLIC BLOOD PRESSURE: 100 MMHG | BODY MASS INDEX: 31.84 KG/M2 | WEIGHT: 215 LBS | HEIGHT: 69 IN

## 2020-02-06 DIAGNOSIS — R10.9 FLANK PAIN: Primary | ICD-10-CM

## 2020-02-06 LAB
BACTERIA UR QL AUTO: NORMAL /HPF
BILIRUB UR QL STRIP: NEGATIVE
CLARITY UR: CLEAR
COLOR UR: YELLOW
GLUCOSE UR STRIP-MCNC: NEGATIVE MG/DL
HGB UR QL STRIP.AUTO: NEGATIVE
HYALINE CASTS #/AREA URNS LPF: NORMAL /LPF
KETONES UR STRIP-MCNC: NEGATIVE MG/DL
LEUKOCYTE ESTERASE UR QL STRIP: NEGATIVE
NITRITE UR QL STRIP: NEGATIVE
NON-SQ EPI CELLS URNS QL MICRO: NORMAL /HPF
PH UR STRIP.AUTO: 6 [PH]
PROT UR STRIP-MCNC: NEGATIVE MG/DL
RBC #/AREA URNS AUTO: NORMAL /HPF
SL AMB  POCT GLUCOSE, UA: NORMAL
SL AMB LEUKOCYTE ESTERASE,UA: NORMAL
SL AMB POCT BILIRUBIN,UA: NORMAL
SL AMB POCT BLOOD,UA: NORMAL
SL AMB POCT CLARITY,UA: CLEAR
SL AMB POCT COLOR,UA: YELLOW
SL AMB POCT KETONES,UA: NORMAL
SL AMB POCT NITRITE,UA: NORMAL
SL AMB POCT PH,UA: 6
SL AMB POCT SPECIFIC GRAVITY,UA: 1.02
SL AMB POCT URINE PROTEIN: NORMAL
SL AMB POCT UROBILINOGEN: 0.2
SP GR UR STRIP.AUTO: 1.02 (ref 1–1.03)
UROBILINOGEN UR QL STRIP.AUTO: 0.2 E.U./DL
WBC #/AREA URNS AUTO: NORMAL /HPF

## 2020-02-06 PROCEDURE — 81003 URINALYSIS AUTO W/O SCOPE: CPT | Performed by: NURSE PRACTITIONER

## 2020-02-06 PROCEDURE — 99213 OFFICE O/P EST LOW 20 MIN: CPT | Performed by: NURSE PRACTITIONER

## 2020-02-06 PROCEDURE — 81001 URINALYSIS AUTO W/SCOPE: CPT | Performed by: NURSE PRACTITIONER

## 2020-02-06 RX ORDER — IBUPROFEN 800 MG/1
800 TABLET ORAL
COMMUNITY
Start: 2019-11-26 | End: 2020-02-06 | Stop reason: ALTCHOICE

## 2020-02-06 NOTE — PROGRESS NOTES
2/6/2020    Chief Complaint   Patient presents with    Nephrolithiasis    Flank Pain     Assessment and Plan    35 y o  male managed by Dr Funk Filter    1  Nephrolithiasis  · US kidney and bladder ordered  · Urine dip in office unremarkable  · If hydronephrosis or stone present will proceed with MET and Tamsulosin will be sent to the pharmacy  · Increase water intake to 90 oz per day  · Call the office for worsening complaints  · ER precautions  · Follow up in the office in 4-6 weeks    History of Present Illness  Kim Hollingsworth is a 35 y o  male here for follow up evaluation of  nephrolithiasis  Tatiana Ferrer presents to the office with right upper quadrant right flank pain   On 10/10/2019 CT renal study is performed and patient was noted to have mild right-sided hydronephrosis of the collecting systems are with a 2 mm stable calculi in the right renal pelvis   He was also noted to have a stable left-sided 2 mm stone in the upper and lower poles with no hydronephrosis noted  Niranjan Guevara continues to complain of right-sided flank cleaning at this facility nausea and occasional urinary frequency   Denies fever and chills   We discussed the need to repeat imaging of with an ultrasound of the kidney bladder and KUB which resulted as a 3 mm calculi in the right proximal ureter with resultant hydronephrosis      Review of Systems   Constitutional: Negative for chills and fever  Respiratory: Negative for cough and shortness of breath  Cardiovascular: Negative for chest pain  Gastrointestinal: Negative for abdominal distention, abdominal pain, blood in stool, nausea and vomiting  Genitourinary: Positive for flank pain, frequency and testicular pain  Negative for difficulty urinating, dysuria, enuresis, hematuria and urgency  Musculoskeletal: Negative for back pain  Skin: Negative for rash  Neurological: Negative for dizziness       Past Medical History  Past Medical History:   Diagnosis Date    Chronic kidney disease     KIDNEY STONES    Kidney stone        Past Social History  Past Surgical History:   Procedure Laterality Date    CYSTOSCOPY W/ LASER LITHOTRIPSY Right 8/24/2017    Procedure: CYSTOSCOPY URETEROSCOPY WITH LITHOTRIPSY HOLMIUM LASER, RETROGRADE PYELOGRAM AND INSERTION STENT URETERAL;  Surgeon: Srinivas Patrick MD;  [facility];  Service: Urology    CYSTOSCOPY W/ LASER LITHOTRIPSY Right 9/14/2017    Procedure: CYSTOSCOPY; RETROGRADE PYELOGRAM; URETEROSCOPY WITH HOLMIUM LASER; stone extraction ,EXCHANGE OF RIGHT URETERAL STENT;  Surgeon: Srinivas Patrick MD;  [facility];  Service: Urology    EXTRACORPOREAL SHOCK WAVE LITHOTRIPSY Right 8/10/2017    Procedure: Andie Calles SHOCKWAVE (ESWL) WITH CYSTOSCOPY AND INSERTION STENT URETERAL;  Surgeon: Srinivas Patrick MD;  [facility];  Service: Urology    FL RETROGRADE PYELOGRAM  8/17/2018    DE CYSTO/URETERO W/LITHOTRIPSY &INDWELL STENT INSRT Right 8/17/2018    Procedure: CYSTOSCOPY URETEROSCOPY, RETROGRADE PYELOGRAM AND INSERTION STENT URETERAL;  Surgeon: Malcolm Weeks MD;  [facility];  Service: Urology    DE CYSTOURETHROSCOPY,URETER CATHETER Right 11/9/2017    Procedure: CYSTOSCOPY RETROGRADE PYELOGRAM WITH URETEROSCOPY WITH LASER AND RIGHT STENT EXCHANGE;  Surgeon: Srinivas Patrick MD;  [facility];  Service: Urology    DE CYSTOURETHROSCOPY,URETER CATHETER N/A 1/4/2018    Procedure: CYSTOSCOPY, BILATERAL RETROGRADE PYELOGRAM WITH RIGHT URETEROSCOPY WITH HYDRODILATION OF STRICTURE , RIGHT STENT INSERTION X 2;  Surgeon: Srinivas Patrick MD;  [facility];  Service: Urology     Social History     Tobacco Use   Smoking Status Light Tobacco Smoker    Types: Cigars   Smokeless Tobacco Never Used   Tobacco Comment    CIGAR - 1-2 per month       Past Family History  Family History   Problem Relation Age of Onset    Kidney disease Mother     Diabetes Mother     Hypertension Mother     Urolithiasis Mother     No Known Problems Father        Past Social history  Social History     Socioeconomic History    Marital status: /Civil Union     Spouse name: Not on file    Number of children: Not on file    Years of education: Not on file    Highest education level: Not on file   Occupational History    Occupation: Warehouse   Social Needs    Financial resource strain: Not on file    Food insecurity:     Worry: Not on file     Inability: Not on file   [place] needs:     Medical: Not on file     Non-medical: Not on file   Tobacco Use    Smoking status: Light Tobacco Smoker     Types: Cigars    Smokeless tobacco: Never Used    Tobacco comment: CIGAR - 1-2 per month   Substance and Sexual Activity    Alcohol use: Yes     Comment: Socially     Drug use: No    Sexual activity: Not on file   Lifestyle    Physical activity:     Days per week: Not on file     Minutes per session: Not on file    Stress: Not on file   Relationships    Social connections:     Talks on phone: Not on file     Gets together: Not on file     Attends Yarsani service: Not on file     Active member of club or organization: Not on file     Attends meetings of clubs or organizations: Not on file     Relationship status: Not on file    Intimate partner violence:     Fear of current or ex partner: Not on file     Emotionally abused: Not on file     Physically abused: Not on file     Forced sexual activity: Not on file   Other Topics Concern    Not on file   Social History Narrative    Not on file       Current Medications  No current outpatient medications on file  No current facility-administered medications for this visit          Allergies  No Known Allergies      The following portions of the patient's history were reviewed and updated as appropriate: allergies, current medications, past medical history, past social history, past surgical history and problem list       Vitals  Vitals:    02/06/20 0814   BP: 100/60   Pulse: 75   Weight: 97 5 kg (215 lb)   Height: 5' 9" (1 753 m)     Physical Exam  Physical Exam   Constitutional: He appears well-developed and well-nourished  No distress  HENT:   Head: Atraumatic  Cardiovascular: Normal rate  Pulmonary/Chest: Effort normal    Abdominal: Soft  He exhibits no distension  There is no tenderness  There is no CVA tenderness  Musculoskeletal: Normal range of motion  Neurological: He is alert  Vitals reviewed  Results  Recent Results (from the past 1 hour(s))   POCT urine dip auto non-scope    Collection Time: 02/06/20  8:24 AM   Result Value Ref Range     COLOR,UA yellow     CLARITY,UA clear     SPECIFIC GRAVITY,UA 1 025      PH,UA 6 0     LEUKOCYTE ESTERASE,UA neg     NITRITE,UA neg     GLUCOSE, UA neg     KETONES,UA neg     BILIRUBIN,UA neg     BLOOD,UA neg     POCT URINE PROTEIN neg     SL AMB POCT UROBILINOGEN 0 2      No results found for: PSA  Lab Results   Component Value Date    CALCIUM 9 0 05/05/2018    K 4 1 05/05/2018    CO2 27 05/05/2018     05/05/2018    BUN 16 05/05/2018    CREATININE 1 11 05/05/2018     Lab Results   Component Value Date    WBC 7 57 10/27/2017    HGB 16 2 10/27/2017    HCT 46 5 10/27/2017    MCV 89 10/27/2017     10/27/2017     Orders  Orders Placed This Encounter   Procedures    US kidney and bladder     Standing Status:   Future     Standing Expiration Date:   2/6/2024     Scheduling Instructions:      "Prep required if being scheduled in conjunction with other studies, refer to those examination's Preps first before scheduling  All patients for US Kidney and Bladder they must drink 24 oz of water 60 minutes before your scheduled appointment time  This test requires you to have a FULL bladder  Please do not urinate before your test             Please bring your physician order, insurance cards, a form of photo ID and a list of your medications with you   Arrive 15 minutes prior to your appointment time in order to register              If you need to have lab work or a urinalysis, please do this AFTER your ultrasound  "     Order Specific Question:   Reason for Exam:     Answer:   nephrolithiasis    Urinalysis with microscopic    POCT urine dip auto non-scope       EVERETT Anders

## 2020-02-08 ENCOUNTER — HOSPITAL ENCOUNTER (OUTPATIENT)
Dept: RADIOLOGY | Facility: HOSPITAL | Age: 40
Discharge: HOME | End: 2020-02-08
Payer: COMMERCIAL

## 2020-02-08 DIAGNOSIS — R10.9 FLANK PAIN: ICD-10-CM

## 2020-02-08 PROCEDURE — 76770 US EXAM ABDO BACK WALL COMP: CPT

## 2020-02-13 ENCOUNTER — TELEPHONE (OUTPATIENT)
Dept: OTHER | Facility: HOSPITAL | Age: 40
End: 2020-02-13

## 2020-02-13 DIAGNOSIS — N13.5 UPJ (URETEROPELVIC JUNCTION) OBSTRUCTION: Primary | ICD-10-CM

## 2020-02-13 NOTE — TELEPHONE ENCOUNTER
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

## 2020-02-13 NOTE — TELEPHONE ENCOUNTER
I think we should order Lasix renogram-his right renal pelvis fullness is chronic, but if he is having right flank pain he may have a problem with the UPJ obstruction  Lasix renogram should help sort that out

## 2020-02-17 NOTE — TELEPHONE ENCOUNTER
Please call to assist pt in scheduling a renal scan in response to his most recent US    There is a concern for possible narrowing of the ureter at the UPJ which may be giving him the flank discomfort

## 2020-02-18 NOTE — TELEPHONE ENCOUNTER
Called patient to schedule renal scan; transferred directly to central scheduling to arrange with someone who speaks Armenian

## 2020-03-10 ENCOUNTER — TRANSCRIBE ORDERS (OUTPATIENT)
Dept: RADIOLOGY | Facility: HOSPITAL | Age: 40
End: 2020-03-10

## 2020-03-10 ENCOUNTER — HOSPITAL ENCOUNTER (OUTPATIENT)
Dept: RADIOLOGY | Facility: HOSPITAL | Age: 40
Discharge: HOME | End: 2020-03-10
Payer: COMMERCIAL

## 2020-03-10 DIAGNOSIS — N13.5 UPJ (URETEROPELVIC JUNCTION) OBSTRUCTION: ICD-10-CM

## 2020-03-10 PROCEDURE — 78707 K FLOW/FUNCT IMAGE W/O DRUG: CPT

## 2020-03-10 PROCEDURE — A9562 TC99M MERTIATIDE: HCPCS

## 2020-03-10 RX ORDER — FUROSEMIDE 10 MG/ML
INJECTION INTRAMUSCULAR; INTRAVENOUS
Status: COMPLETED
Start: 2020-03-10 | End: 2020-03-10

## 2020-03-10 RX ORDER — FUROSEMIDE 10 MG/ML
40 INJECTION INTRAMUSCULAR; INTRAVENOUS
Status: DISCONTINUED | OUTPATIENT
Start: 2020-03-10 | End: 2020-03-11 | Stop reason: HOSPADM

## 2020-03-10 RX ADMIN — FUROSEMIDE 40 MG: 10 INJECTION, SOLUTION INTRAMUSCULAR; INTRAVENOUS at 12:35

## 2020-03-27 ENCOUNTER — TELEMEDICINE (OUTPATIENT)
Dept: UROLOGY | Facility: AMBULATORY SURGERY CENTER | Age: 40
End: 2020-03-27
Payer: COMMERCIAL

## 2020-03-27 DIAGNOSIS — N13.30 HYDRONEPHROSIS OF RIGHT KIDNEY: Primary | ICD-10-CM

## 2020-03-27 PROCEDURE — 99213 OFFICE O/P EST LOW 20 MIN: CPT | Performed by: UROLOGY

## 2020-03-27 NOTE — PROGRESS NOTES
Virtual Regular Visit    Problem List Items Addressed This Visit     None               Reason for visit is Mag-3 renal scan results, R flank pain    Encounter provider Kiana Blanco MD    Provider located at [address]      Recent Visits  No visits were found meeting these conditions  Showing recent visits within past 7 days and meeting all other requirements     Today's Visits  Date Type Provider Dept   03/27/20 Telemedicine Kiana Blanco MD [facility]   Showing today's visits and meeting all other requirements     Future Appointments  No visits were found meeting these conditions  Showing future appointments within next 150 days and meeting all other requirements        After connecting through [employer], the patient was identified by name and date of birth  Marinda Bernheim was informed that this is a telemedicine visit and that the visit is being conducted through [employer] and patient was informed that this is not a secure, HIPAA-complaint platform  he agrees to proceed  which may not be secure and therefore, might not be HIPAA-compliant  My office door was closed  No one else was in the room  He acknowledged consent and understanding of privacy and security of the video platform  The patient has agreed to participate and understands they can discontinue the visit at any time  Subjective  Marinda Bernheim is a 35 y o  male with intermittent right flank pain  A recent ultrasound showed some fullness on the right side and there was question of a stricture versus UPJ  He recently had a Mag 3 renal scan with Lasix performed  This phone call as to discuss the results  This was a video call on face time  He recently had some right flank discomfort with radiation towards the testicle which has resolved for the most part  We discussed that this could be consistent with passing a small stone  He states that he consumes a fair amount of water and that his urine is occasionally dark  He denies any teressa blood  His wife is present with him on face time today  Mag 3 renal scan with Lasix reveals 50/50 split renal function  There is bilateral contrast washout within 4-5 minutes  This is a normal Mag 3 renal scan without signs of obstruction      Past Medical History:   Diagnosis Date    Chronic kidney disease     KIDNEY STONES    Kidney stone        Past Surgical History:   Procedure Laterality Date    CYSTOSCOPY W/ LASER LITHOTRIPSY Right 8/24/2017    Procedure: CYSTOSCOPY URETEROSCOPY WITH LITHOTRIPSY HOLMIUM LASER, RETROGRADE PYELOGRAM AND INSERTION STENT URETERAL;  Surgeon: Rosibel Everett MD;  [facility];  Service: Urology    CYSTOSCOPY W/ LASER LITHOTRIPSY Right 9/14/2017    Procedure: CYSTOSCOPY; RETROGRADE PYELOGRAM; URETEROSCOPY WITH HOLMIUM LASER; stone extraction ,EXCHANGE OF RIGHT URETERAL STENT;  Surgeon: Rosibel Everett MD;  [facility];  Service: Urology    EXTRACORPOREAL SHOCK WAVE LITHOTRIPSY Right 8/10/2017    Procedure: Debibe Velascoica SHOCKWAVE (ESWL) WITH CYSTOSCOPY AND INSERTION STENT URETERAL;  Surgeon: Rosibel Everett MD;  [facility];  Service: Urology    FL RETROGRADE PYELOGRAM  8/17/2018    [place] CYSTO/URETERO W/LITHOTRIPSY &INDWELL STENT INSRT Right 8/17/2018    Procedure: CYSTOSCOPY URETEROSCOPY, RETROGRADE PYELOGRAM AND INSERTION STENT URETERAL;  Surgeon: Stephane Hancock MD;  [facility];  Service: Urology    IL CYSTOURETHROSCOPY,URETER CATHETER Right 11/9/2017    Procedure: CYSTOSCOPY RETROGRADE PYELOGRAM WITH URETEROSCOPY WITH LASER AND RIGHT STENT EXCHANGE;  Surgeon: Rosibel Everett MD;  [facility];  Service: Urology    IL CYSTOURETHROSCOPY,URETER CATHETER N/A 1/4/2018    Procedure: CYSTOSCOPY, BILATERAL RETROGRADE PYELOGRAM WITH RIGHT URETEROSCOPY WITH HYDRODILATION OF STRICTURE , RIGHT STENT INSERTION X 2; Surgeon: Paradise Samuel MD;  [facility];  Service: Urology       No current outpatient medications on file  No current facility-administered medications for this visit  No Known Allergies    Review of Systems   Constitutional: Negative  HENT: Negative  Eyes: Negative  Respiratory: Negative  Cardiovascular: Negative  Gastrointestinal: Negative  Endocrine: Negative  Genitourinary:        Per HPI   Musculoskeletal: Negative  Skin: Negative  Allergic/Immunologic: Negative  Neurological: Negative  Hematological: Negative  Psychiatric/Behavioral: Negative  Physical Exam   On examination he is well-appearing  He is in no acute distress via video  Impression:  History of nephrolithiasis, normal Mag 3 renal scan with Lasix    Plan:  I recommend that he continue to stay well hydrated  If he has any worsening of flank pain he should call for follow-up otherwise he may be passing a small punctate stone  Recommend follow-up in 6 months with a repeat retroperitoneal ultrasound or sooner if needed  I spent 15 minutes with the patient during this visit

## 2020-07-17 ENCOUNTER — TELEPHONE (OUTPATIENT)
Dept: UROLOGY | Facility: MEDICAL CENTER | Age: 40
End: 2020-07-17

## 2020-07-17 DIAGNOSIS — N20.0 NEPHROLITHIASIS: Primary | ICD-10-CM

## 2020-07-17 NOTE — TELEPHONE ENCOUNTER
Patient previously seen with Dr Richy Koch to report testicular and abdomen pain  Duration of week and a half    Patient's wife can be reached at [phone]

## 2020-07-17 NOTE — TELEPHONE ENCOUNTER
Patient of [facility] office  History of nephrolithiasis and flank pain  Patient was last seen by Dr Niki Zabala for virtual visit in March 2020  Mag 3 renal scan results were reviewed at that visit  Patient noted some right sided flank pain that radiated to groin at the time, Dr Niki Zabala advised that could be associated with passing stone  Patient was recommended to follow up in September with US ptv  These appointments are scheduled  Patient now calling with report of testicular/abdominal pain for about last week and a half  Call returned to wife (per 's request as he can not answer phone at work)  She states that he complains of pain, similar to pain described at last office visit  Wife states he drinks "a lot of water"  She also states that patient denies fever, chills, hematuria, dysuria, urgency or frequency  She does states that he is complaining of hesitancy when he starts to void  Advised that patient should increase water intake, avoid bladder irritants  Will route to provider for further recommendations  Reviewed ER precautions with wife for weekend, in event that we are unable to follow up before the end of the day

## 2020-07-20 NOTE — TELEPHONE ENCOUNTER
Patient can go for US now due to report of symptoms and history  Order remains in [address]  Please review ER precautions

## 2020-07-20 NOTE — TELEPHONE ENCOUNTER
Call returned to wife, advised that patient should have US now due to report of symptoms/history  Wife verbalized understanding and will call central scheduling  ER precautions reviewed

## 2020-07-24 ENCOUNTER — TRANSCRIBE ORDERS (OUTPATIENT)
Dept: RADIOLOGY | Facility: HOSPITAL | Age: 40
End: 2020-07-24

## 2020-07-24 ENCOUNTER — HOSPITAL ENCOUNTER (OUTPATIENT)
Dept: RADIOLOGY | Facility: HOSPITAL | Age: 40
Discharge: HOME | End: 2020-07-24
Attending: UROLOGY
Payer: COMMERCIAL

## 2020-07-24 DIAGNOSIS — N13.30 HYDRONEPHROSIS OF RIGHT KIDNEY: ICD-10-CM

## 2020-07-24 PROCEDURE — 76770 US EXAM ABDO BACK WALL COMP: CPT

## 2020-07-29 ENCOUNTER — TELEPHONE (OUTPATIENT)
Dept: UROLOGY | Facility: MEDICAL CENTER | Age: 40
End: 2020-07-29

## 2020-07-29 NOTE — TELEPHONE ENCOUNTER
No obstructing stone or hydronephrosis identified  He was noted to have an 8x6 mm right lower pole calculus  Would encourage patient to continue staying well hydrated, otc NSAIDs as needed, and review ER precautions

## 2020-07-29 NOTE — TELEPHONE ENCOUNTER
Call placed to patient, spoke with him and his wife per communication consent form  Advised per provider no obstructing stone or hydronephrosis identified  He was noted to have an 8x6 mm right lower pole calculus  Encouraged patient to continue staying well hydrated, otc NSAIDs as needed, and reviewed ER precautions  Patient will follow up as scheduled

## 2020-07-29 NOTE — TELEPHONE ENCOUNTER
Patient of Dr Fitzpatrick Kidney seen in [place] office  Patient calling to get results of his ultrasound

## 2020-07-29 NOTE — TELEPHONE ENCOUNTER
Arabella Chow 58 minutes ago (1:37 PM)         Patient of Dr Simon Mcgarry seen in [place] office   Patient calling to get results of his ultrasound

## 2020-07-31 NOTE — TELEPHONE ENCOUNTER
Patient experiencing level 7 pain  States last night pain was a 10  Would like a call back from clinical to discuss      He can be reached at [phone]

## 2020-07-31 NOTE — TELEPHONE ENCOUNTER
Call placed to patient/wife  Left detailed message per communication consent form to advise if he is having pain unrelieved by OTC NSAIDs patient should report to ER for further evaluation (especially given the weekend)  Patient can also call office to schedule sooner appointment  Office number provided on voicemail for questions

## 2020-08-03 RX ORDER — TAMSULOSIN HYDROCHLORIDE 0.4 MG/1
0.4 CAPSULE ORAL
Qty: 30 CAPSULE | Refills: 0 | Status: SHIPPED | OUTPATIENT
Start: 2020-08-03

## 2020-08-03 NOTE — TELEPHONE ENCOUNTER
Patients wife called and wants to talk to the nurse about her husbands medical status and please call her back at [phone]

## 2020-08-03 NOTE — TELEPHONE ENCOUNTER
Called and spoke with the patient's wife  The patient did end up going to [facility] ER over the weekend  He was diagnosed with passing a kidney stone  CT demonstrates per care everywhere: Impression:   1  Right hydronephrosis secondary to 5 mm calculus at the mid ureter  2  Left nephrolithiasis  Additional findings as detailed above  Testicular US completed as well and was normal with the exception of right epididymal head cyst      He was discharged with flomax, prescription strength ibuprofen and narcotic pain management as well  Wife concerned about the next steps  The renal US he had on 7/24 did not show hydro or passing stone or any stones in his left kidney but the testicular pain has been the same all along  Currently the patient's pain is well managed at 1/10 bilateral testicles  He denies swelling  He denies fever, chills, nausea, vomiting, and hematuria  I offered scheduling an appt right away to discuss plan moving forward  Patient is due to start back to work tomorrow  Wife would not be against trial of passing the stone with fu after a week or so as long as we would refill the ibuprofen if needed  However the patient seems interested in intervention to remove the stone in case he cannot pass it  Will review with provider for follow up recommendations  In the meantime I encouraged straining his urine, hydrations, pain management with NSAIDs and narcotic pain management if needed  ER precautions still for fever/chills/n/v  Wife verbalized understanding

## 2020-08-03 NOTE — TELEPHONE ENCOUNTER
Compared results of recent renal ultrasound to CT scan performed at [facility]  Patient should be scheduled an appointment to further review and discuss possible surgical intervention  In the interim, will prescribe Flomax for medical expulsive therapy  Please review possible side effects and Er precautions

## 2020-08-12 NOTE — TELEPHONE ENCOUNTER
Bogdan Martinez 36 minutes ago (10:57 AM)          Patients (EC) Maryann Anthony called in asking if patient needs to have US or XR done prior to his appointment on 2020 to see where the stone is  Patient had a US kidney/bladder on 2020  EC can be reached at [phone]        Patient also had CT done earlier this morning at [facility], which was reviewed by provider  Will route to provider to advise if she would like any further imaging prior to upcoming appointment  Will follow up with patient

## 2020-08-12 NOTE — TELEPHONE ENCOUNTER
Patient does not need repeat imaging, as he recently had CT stone study at [facility] on 8/1  Will re-evaluate symptoms, and discuss further at upcoming appointment  He should continue Flomax for medical expulsive therapy

## 2020-08-12 NOTE — TELEPHONE ENCOUNTER
Patients (EC) Edgar Adams called in asking if patient needs to have US or XR done prior to his appointment on 8/18/2020 to see where the stone is  Patient had a US kidney/bladder on 7/24/2020    EC can be reached at [phone]

## 2020-08-18 ENCOUNTER — OFFICE VISIT (OUTPATIENT)
Dept: UROLOGY | Facility: AMBULATORY SURGERY CENTER | Age: 40
End: 2020-08-18
Payer: COMMERCIAL

## 2020-08-18 VITALS
HEART RATE: 81 BPM | DIASTOLIC BLOOD PRESSURE: 80 MMHG | SYSTOLIC BLOOD PRESSURE: 110 MMHG | TEMPERATURE: 97.3 F | WEIGHT: 215 LBS | HEIGHT: 69 IN | BODY MASS INDEX: 31.84 KG/M2

## 2020-08-18 DIAGNOSIS — N20.0 NEPHROLITHIASIS: Primary | ICD-10-CM

## 2020-08-18 DIAGNOSIS — N20.1 URETERAL CALCULUS: ICD-10-CM

## 2020-08-18 LAB
SL AMB  POCT GLUCOSE, UA: ABNORMAL
SL AMB LEUKOCYTE ESTERASE,UA: ABNORMAL
SL AMB POCT BILIRUBIN,UA: ABNORMAL
SL AMB POCT BLOOD,UA: 2
SL AMB POCT CLARITY,UA: CLEAR
SL AMB POCT COLOR,UA: YELLOW
SL AMB POCT KETONES,UA: ABNORMAL
SL AMB POCT NITRITE,UA: ABNORMAL
SL AMB POCT PH,UA: 5
SL AMB POCT SPECIFIC GRAVITY,UA: 1.02
SL AMB POCT URINE PROTEIN: ABNORMAL
SL AMB POCT UROBILINOGEN: ABNORMAL

## 2020-08-18 PROCEDURE — 99214 OFFICE O/P EST MOD 30 MIN: CPT | Performed by: NURSE PRACTITIONER

## 2020-08-18 PROCEDURE — 81002 URINALYSIS NONAUTO W/O SCOPE: CPT | Performed by: NURSE PRACTITIONER

## 2020-08-18 PROCEDURE — 87086 URINE CULTURE/COLONY COUNT: CPT | Performed by: NURSE PRACTITIONER

## 2020-08-18 RX ORDER — CEFAZOLIN SODIUM 2 G/50ML
2000 SOLUTION INTRAVENOUS ONCE
Status: CANCELLED | OUTPATIENT
Start: 2020-09-08 | End: 2020-08-18

## 2020-08-18 RX ORDER — TRAMADOL HYDROCHLORIDE 50 MG/1
50 TABLET ORAL EVERY 8 HOURS PRN
Qty: 15 TABLET | Refills: 0 | Status: SHIPPED | OUTPATIENT
Start: 2020-08-18

## 2020-08-18 NOTE — PROGRESS NOTES
8/18/2020    Cristy Shrestha  1980  [id number]      Assessment  -Nephrolithiasis s/p right URS (9/2017), right URS (8/2018)    Discussion/Plan  Remo Fothergill is a 38 y o  male being managed by Dr Meagan Smith  1  Ureteral calculus- we reviewed the results of his CT scan performed with IV contrast at outside facility which identified a 5 mm right mid ureteral calculus with hydronephrosis  He continues to report episodes of flank/abdominal pain and changes in his urinary pattern  Patient is interested in surgical intervention  I recommend obtaining a CT stone study in prone position prior to surgery  Order placed in epic  We will proceed with scheduling cystoscopy, right-sided ureteroscopy, holmium laser lithotripsy, retrograde pyelogram, and ureteral stent placement  Informed consent was provided including risks and benefits  Surgery coordinator will be in contact with patient to arrange  Surgical consent to be signed by performing MD  We did discuss possibility that this may be a staged procedure  If stone is not identified on CT scan or patient passes stone, case will be cancelled  In the interim, he will continue to take tamsulosin, increase water intake, and strain all urine  ER precautions were reviewed  Prescription refill for tramadol was electronically sent to his pharmacy  We will consider referral to nephrology after acute stone episode is addressed     -All questions answered, patient and spouse agrees with plan      History of Present Illness  38 y o  male with a history of nephrolithiais presents today for follow up  Patient last seen via telemedicine visit in March  At that time, he had reported intermittent right-sided flank pain  Mag 3 renal Lasix scan was performed due to questionable fullness on right side and possible stricture versus UPJ  Fortunately, there was no evidence of obstruction  He recently experienced acute onset right-sided abdominal and testicular pain  He was advised to get renal ultrasound which showed no evidence of obstructing stone or hydronephrosis  He did have a right lower pole nonobstructing calculus measuring 8 x 6 mm  Patient presented to emergency department at [facility] on 08/01/2020  CT scan identified a 5mm right mid ureteral calculus with hydronephrosis  Patient continues to experience intermittent episodes of right-sided abdominal pain and nausea  He also notes dysuria and split urinary stream   Patient denies any gross hematuria, fever, or chills  He has been taking Flomax 0 4 mg HS, but states he has not been straining his urine  Patient has had several ureteroscopy and lithotripsy procedures in the past   His prior stone analysis had identified calcium oxalate  Review of Systems  Review of Systems   Constitutional: Negative  HENT: Negative  Respiratory: Negative  Cardiovascular: Negative  Gastrointestinal: Positive for abdominal pain  Genitourinary: Positive for flank pain and testicular pain  Negative for decreased urine volume, difficulty urinating, dysuria, frequency, hematuria and urgency  Skin: Negative  Neurological: Negative  Psychiatric/Behavioral: Negative          Past Medical History  Past Medical History:   Diagnosis Date    Chronic kidney disease     KIDNEY STONES    Kidney stone        Past Social History  Past Surgical History:   Procedure Laterality Date    CYSTOSCOPY W/ LASER LITHOTRIPSY Right 8/24/2017    Procedure: CYSTOSCOPY URETEROSCOPY WITH LITHOTRIPSY HOLMIUM LASER, RETROGRADE PYELOGRAM AND INSERTION STENT URETERAL;  Surgeon: Yanelis Small MD;  [facility];  Service: Urology    CYSTOSCOPY W/ LASER LITHOTRIPSY Right 9/14/2017    Procedure: CYSTOSCOPY; RETROGRADE PYELOGRAM; URETEROSCOPY WITH HOLMIUM LASER; stone extraction ,EXCHANGE OF RIGHT URETERAL STENT;  Surgeon: Yanelis Small MD;  [facility];  Service: Urology   [address] [place] [address]  LITHOTRIPSY Right 8/10/2017    Procedure: Errol Morataya SHOCKWAVE (ESWL) WITH CYSTOSCOPY AND INSERTION STENT URETERAL;  Surgeon: Falguni Campos MD;  [facility];  Service: Urology    FL RETROGRADE PYELOGRAM  8/17/2018    MI CYSTO/URETERO W/LITHOTRIPSY &INDWELL STENT INSRT Right 8/17/2018    Procedure: CYSTOSCOPY URETEROSCOPY, RETROGRADE PYELOGRAM AND INSERTION STENT URETERAL;  Surgeon: Gavi Mac MD;  [facility];  Service: Urology    MI CYSTOURETHROSCOPY,URETER CATHETER Right 11/9/2017    Procedure: CYSTOSCOPY RETROGRADE PYELOGRAM WITH URETEROSCOPY WITH LASER AND RIGHT STENT EXCHANGE;  Surgeon: Falguni Campos MD;  [facility];  Service: Urology    MI CYSTOURETHROSCOPY,URETER CATHETER N/A 1/4/2018    Procedure: CYSTOSCOPY, BILATERAL RETROGRADE PYELOGRAM WITH RIGHT URETEROSCOPY WITH HYDRODILATION OF STRICTURE , RIGHT STENT INSERTION X 2;  Surgeon: Falguni Campos MD;  [facility];  Service: Urology       Past Family History  Family History   Problem Relation Age of Onset    Kidney disease Mother     Diabetes Mother     Hypertension Mother     Urolithiasis Mother     No Known Problems Father        Past Social history  Social History     Socioeconomic History    Marital status: /Civil Union     Spouse name: Not on file    Number of children: Not on file    Years of education: Not on file    Highest education level: Not on file   Occupational History    Occupation: Warehouse   Social Needs    Financial resource strain: Not on file    Food insecurity     Worry: Not on file     Inability: Not on file   [employer] needs     Medical: Not on file     Non-medical: Not on file   Tobacco Use    Smoking status: Light Tobacco Smoker     Types: Cigars    Smokeless tobacco: Never Used    Tobacco comment: CIGAR - 1-2 per month   Substance and Sexual Activity    Alcohol use: Yes     Comment: Socially     Drug use: No    Sexual activity: Not on file   Lifestyle    Physical activity     Days per week: Not on file     Minutes per session: Not on file    Stress: Not on file   Relationships    Social connections     Talks on phone: Not on file     Gets together: Not on file     Attends Scientologist service: Not on file     Active member of club or organization: Not on file     Attends meetings of clubs or organizations: Not on file     Relationship status: Not on file    Intimate partner violence     Fear of current or ex partner: Not on file     Emotionally abused: Not on file     Physically abused: Not on file     Forced sexual activity: Not on file   Other Topics Concern    Not on file   Social History Narrative    Not on file       Current Medications  Current Outpatient Medications   Medication Sig Dispense Refill    tamsulosin (FLOMAX) 0 4 mg Take 1 capsule (0 4 mg total) by mouth daily with dinner 30 capsule 0    traMADol (ULTRAM) 50 mg tablet Take 1 tablet (50 mg total) by mouth every 8 (eight) hours as needed for moderate pain 15 tablet 0     No current facility-administered medications for this visit  Allergies  No Known Allergies    Past Medical History, Social History, Family History, medications and allergies were reviewed  Vitals  Vitals:    08/18/20 0737   BP: 110/80   BP Location: Left arm   Patient Position: Sitting   Cuff Size: Adult   Pulse: 81   Temp: (!) 97 3 °F (36 3 °C)   TempSrc: Temporal   Weight: 97 5 kg (215 lb)   Height: 5' 9" (1 753 m)       Physical Exam  Physical Exam  Constitutional:       Appearance: Normal appearance  He is well-developed  HENT:      Head: Normocephalic  Eyes:      Pupils: Pupils are equal, round, and reactive to light  Neck:      Musculoskeletal: Normal range of motion  Cardiovascular:      Rate and Rhythm: Normal rate and regular rhythm  Pulses: Normal pulses  Heart sounds: No murmur  No friction rub  No gallop  Pulmonary:      Effort: Pulmonary effort is normal  No respiratory distress  Breath sounds: Normal breath sounds  No stridor  No wheezing, rhonchi or rales  Chest:      Chest wall: No tenderness  Abdominal:      Palpations: Abdomen is soft  Genitourinary:     Comments: Negative CVA tenderness  Musculoskeletal: Normal range of motion  Skin:     General: Skin is warm  Neurological:      General: No focal deficit present  Mental Status: He is alert and oriented to person, place, and time  Psychiatric:         Mood and Affect: Mood normal          Behavior: Behavior normal          Thought Content: Thought content normal          Judgment: Judgment normal          Results    I have personally reviewed all pertinent lab results and reviewed with patient  No results found for: PSA  Lab Results   Component Value Date    CALCIUM 9 0 05/05/2018    K 4 1 05/05/2018    CO2 27 05/05/2018     05/05/2018    BUN 16 05/05/2018    CREATININE 1 11 05/05/2018     Lab Results   Component Value Date    WBC 7 57 10/27/2017    HGB 16 2 10/27/2017    HCT 46 5 10/27/2017    MCV 89 10/27/2017     10/27/2017     No results found for this or any previous visit (from the past 1 hour(s))

## 2020-08-18 NOTE — H&P (VIEW-ONLY)
8/18/2020    Geno Bodily  1980  [id number]      Assessment  -Nephrolithiasis s/p right URS (9/2017), right URS (8/2018)    Discussion/Plan  Casie Clark is a 38 y o  male being managed by Dr America Villagomez  1  Ureteral calculus- we reviewed the results of his CT scan performed with IV contrast at outside facility which identified a 5 mm right mid ureteral calculus with hydronephrosis  He continues to report episodes of flank/abdominal pain and changes in his urinary pattern  Patient is interested in surgical intervention  I recommend obtaining a CT stone study in prone position prior to surgery  Order placed in epic  We will proceed with scheduling cystoscopy, right-sided ureteroscopy, holmium laser lithotripsy, retrograde pyelogram, and ureteral stent placement  Informed consent was provided including risks and benefits  Surgery coordinator will be in contact with patient to arrange  Surgical consent to be signed by performing MD  We did discuss possibility that this may be a staged procedure  If stone is not identified on CT scan or patient passes stone, case will be cancelled  In the interim, he will continue to take tamsulosin, increase water intake, and strain all urine  ER precautions were reviewed  Prescription refill for tramadol was electronically sent to his pharmacy  We will consider referral to nephrology after acute stone episode is addressed     -All questions answered, patient and spouse agrees with plan      History of Present Illness  38 y o  male with a history of nephrolithiais presents today for follow up  Patient last seen via telemedicine visit in March  At that time, he had reported intermittent right-sided flank pain  Mag 3 renal Lasix scan was performed due to questionable fullness on right side and possible stricture versus UPJ  Fortunately, there was no evidence of obstruction  He recently experienced acute onset right-sided abdominal and testicular pain  He was advised to get renal ultrasound which showed no evidence of obstructing stone or hydronephrosis  He did have a right lower pole nonobstructing calculus measuring 8 x 6 mm  Patient presented to emergency department at [facility] on 08/01/2020  CT scan identified a 5mm right mid ureteral calculus with hydronephrosis  Patient continues to experience intermittent episodes of right-sided abdominal pain and nausea  He also notes dysuria and split urinary stream   Patient denies any gross hematuria, fever, or chills  He has been taking Flomax 0 4 mg HS, but states he has not been straining his urine  Patient has had several ureteroscopy and lithotripsy procedures in the past   His prior stone analysis had identified calcium oxalate  Review of Systems  Review of Systems   Constitutional: Negative  HENT: Negative  Respiratory: Negative  Cardiovascular: Negative  Gastrointestinal: Positive for abdominal pain  Genitourinary: Positive for flank pain and testicular pain  Negative for decreased urine volume, difficulty urinating, dysuria, frequency, hematuria and urgency  Skin: Negative  Neurological: Negative  Psychiatric/Behavioral: Negative          Past Medical History  Past Medical History:   Diagnosis Date    Chronic kidney disease     KIDNEY STONES    Kidney stone        Past Social History  Past Surgical History:   Procedure Laterality Date    CYSTOSCOPY W/ LASER LITHOTRIPSY Right 8/24/2017    Procedure: CYSTOSCOPY URETEROSCOPY WITH LITHOTRIPSY HOLMIUM LASER, RETROGRADE PYELOGRAM AND INSERTION STENT URETERAL;  Surgeon: Jackelin Gonsalves MD;  [facility];  Service: Urology    CYSTOSCOPY W/ LASER LITHOTRIPSY Right 9/14/2017    Procedure: CYSTOSCOPY; RETROGRADE PYELOGRAM; URETEROSCOPY WITH HOLMIUM LASER; stone extraction ,EXCHANGE OF RIGHT URETERAL STENT;  Surgeon: Jackelin Gonsalves MD;  [facility];  Service: Urology   [address] [place] [address]  LITHOTRIPSY Right 8/10/2017    Procedure: Anibal Mead SHOCKWAVE (ESWL) WITH CYSTOSCOPY AND INSERTION STENT URETERAL;  Surgeon: Arely Jones MD;  [facility];  Service: Urology    FL RETROGRADE PYELOGRAM  8/17/2018    ND CYSTO/URETERO W/LITHOTRIPSY &INDWELL STENT INSRT Right 8/17/2018    Procedure: CYSTOSCOPY URETEROSCOPY, RETROGRADE PYELOGRAM AND INSERTION STENT URETERAL;  Surgeon: Marilee Hall MD;  [facility];  Service: Urology    ND CYSTOURETHROSCOPY,URETER CATHETER Right 11/9/2017    Procedure: CYSTOSCOPY RETROGRADE PYELOGRAM WITH URETEROSCOPY WITH LASER AND RIGHT STENT EXCHANGE;  Surgeon: Arely Jnoes MD;  [facility];  Service: Urology    ND CYSTOURETHROSCOPY,URETER CATHETER N/A 1/4/2018    Procedure: CYSTOSCOPY, BILATERAL RETROGRADE PYELOGRAM WITH RIGHT URETEROSCOPY WITH HYDRODILATION OF STRICTURE , RIGHT STENT INSERTION X 2;  Surgeon: Arely Jones MD;  [facility];  Service: Urology       Past Family History  Family History   Problem Relation Age of Onset    Kidney disease Mother     Diabetes Mother     Hypertension Mother     Urolithiasis Mother     No Known Problems Father        Past Social history  Social History     Socioeconomic History    Marital status: /Civil Union     Spouse name: Not on file    Number of children: Not on file    Years of education: Not on file    Highest education level: Not on file   Occupational History    Occupation: Warehouse   Social Needs    Financial resource strain: Not on file    Food insecurity     Worry: Not on file     Inability: Not on file   [employer] needs     Medical: Not on file     Non-medical: Not on file   Tobacco Use    Smoking status: Light Tobacco Smoker     Types: Cigars    Smokeless tobacco: Never Used    Tobacco comment: CIGAR - 1-2 per month   Substance and Sexual Activity    Alcohol use: Yes     Comment: Socially     Drug use: No    Sexual activity: Not on file   Lifestyle    Physical activity     Days per week: Not on file     Minutes per session: Not on file    Stress: Not on file   Relationships    Social connections     Talks on phone: Not on file     Gets together: Not on file     Attends Zoroastrianism service: Not on file     Active member of club or organization: Not on file     Attends meetings of clubs or organizations: Not on file     Relationship status: Not on file    Intimate partner violence     Fear of current or ex partner: Not on file     Emotionally abused: Not on file     Physically abused: Not on file     Forced sexual activity: Not on file   Other Topics Concern    Not on file   Social History Narrative    Not on file       Current Medications  Current Outpatient Medications   Medication Sig Dispense Refill    tamsulosin (FLOMAX) 0 4 mg Take 1 capsule (0 4 mg total) by mouth daily with dinner 30 capsule 0    traMADol (ULTRAM) 50 mg tablet Take 1 tablet (50 mg total) by mouth every 8 (eight) hours as needed for moderate pain 15 tablet 0     No current facility-administered medications for this visit  Allergies  No Known Allergies    Past Medical History, Social History, Family History, medications and allergies were reviewed  Vitals  Vitals:    08/18/20 0737   BP: 110/80   BP Location: Left arm   Patient Position: Sitting   Cuff Size: Adult   Pulse: 81   Temp: (!) 97 3 °F (36 3 °C)   TempSrc: Temporal   Weight: 97 5 kg (215 lb)   Height: 5' 9" (1 753 m)       Physical Exam  Physical Exam  Constitutional:       Appearance: Normal appearance  He is well-developed  HENT:      Head: Normocephalic  Eyes:      Pupils: Pupils are equal, round, and reactive to light  Neck:      Musculoskeletal: Normal range of motion  Cardiovascular:      Rate and Rhythm: Normal rate and regular rhythm  Pulses: Normal pulses  Heart sounds: No murmur  No friction rub  No gallop  Pulmonary:      Effort: Pulmonary effort is normal  No respiratory distress  Breath sounds: Normal breath sounds  No stridor  No wheezing, rhonchi or rales  Chest:      Chest wall: No tenderness  Abdominal:      Palpations: Abdomen is soft  Genitourinary:     Comments: Negative CVA tenderness  Musculoskeletal: Normal range of motion  Skin:     General: Skin is warm  Neurological:      General: No focal deficit present  Mental Status: He is alert and oriented to person, place, and time  Psychiatric:         Mood and Affect: Mood normal          Behavior: Behavior normal          Thought Content: Thought content normal          Judgment: Judgment normal          Results    I have personally reviewed all pertinent lab results and reviewed with patient  No results found for: PSA  Lab Results   Component Value Date    CALCIUM 9 0 05/05/2018    K 4 1 05/05/2018    CO2 27 05/05/2018     05/05/2018    BUN 16 05/05/2018    CREATININE 1 11 05/05/2018     Lab Results   Component Value Date    WBC 7 57 10/27/2017    HGB 16 2 10/27/2017    HCT 46 5 10/27/2017    MCV 89 10/27/2017     10/27/2017     No results found for this or any previous visit (from the past 1 hour(s))

## 2020-08-19 ENCOUNTER — TELEPHONE (OUTPATIENT)
Dept: UROLOGY | Facility: AMBULATORY SURGERY CENTER | Age: 40
End: 2020-08-19

## 2020-08-19 LAB — BACTERIA UR CULT: NORMAL

## 2020-08-19 NOTE — TELEPHONE ENCOUNTER
Confirmed 9/8 with Dr Santa Brown @ [facility] 1628  Instructions and testing reviewed  Paperwork mailed  Patient's wife(on CC) requested note for Patient's employer to notify about upcoming surgery  Mailed with paperwork  CONSENT EMAILED TO PAT'S FOR ON ADMIT

## 2020-08-25 ENCOUNTER — APPOINTMENT (OUTPATIENT)
Dept: LAB | Age: 40
End: 2020-08-25
Payer: COMMERCIAL

## 2020-08-25 DIAGNOSIS — N20.0 NEPHROLITHIASIS: ICD-10-CM

## 2020-08-25 LAB
BACTERIA UR QL AUTO: NORMAL /HPF
BILIRUB UR QL STRIP: NEGATIVE
CLARITY UR: CLEAR
COLOR UR: YELLOW
GLUCOSE UR STRIP-MCNC: NEGATIVE MG/DL
HGB UR QL STRIP.AUTO: NEGATIVE
HYALINE CASTS #/AREA URNS LPF: NORMAL /LPF
KETONES UR STRIP-MCNC: NEGATIVE MG/DL
LEUKOCYTE ESTERASE UR QL STRIP: NEGATIVE
NITRITE UR QL STRIP: NEGATIVE
NON-SQ EPI CELLS URNS QL MICRO: NORMAL /HPF
PH UR STRIP.AUTO: 6 [PH]
PROT UR STRIP-MCNC: NEGATIVE MG/DL
RBC #/AREA URNS AUTO: NORMAL /HPF
SP GR UR STRIP.AUTO: 1.02 (ref 1–1.03)
UROBILINOGEN UR QL STRIP.AUTO: 0.2 E.U./DL
WBC #/AREA URNS AUTO: NORMAL /HPF

## 2020-08-25 PROCEDURE — 87086 URINE CULTURE/COLONY COUNT: CPT | Performed by: NURSE PRACTITIONER

## 2020-08-25 PROCEDURE — 81001 URINALYSIS AUTO W/SCOPE: CPT | Performed by: NURSE PRACTITIONER

## 2020-08-26 LAB — BACTERIA UR CULT: NORMAL

## 2020-08-28 ENCOUNTER — TRANSCRIBE ORDERS (OUTPATIENT)
Dept: RADIOLOGY | Facility: HOSPITAL | Age: 40
End: 2020-08-28

## 2020-08-28 ENCOUNTER — HOSPITAL ENCOUNTER (OUTPATIENT)
Dept: RADIOLOGY | Facility: HOSPITAL | Age: 40
Discharge: HOME | End: 2020-08-28
Payer: COMMERCIAL

## 2020-08-28 DIAGNOSIS — Z91.89 AT RISK FOR OBSTRUCTIVE SLEEP APNEA: Primary | ICD-10-CM

## 2020-08-28 DIAGNOSIS — N20.0 NEPHROLITHIASIS: ICD-10-CM

## 2020-08-28 PROCEDURE — G1004 CDSM NDSC: HCPCS

## 2020-08-28 PROCEDURE — 74176 CT ABD & PELVIS W/O CONTRAST: CPT

## 2020-08-28 RX ORDER — IBUPROFEN 600 MG/1
TABLET ORAL EVERY 6 HOURS PRN
COMMUNITY

## 2020-08-28 NOTE — PRE-PROCEDURE INSTRUCTIONS
Pre-Surgery Instructions:   Medication Instructions    Cholecalciferol (VITAMIN D3 GUMMIES ADULT PO) Instructed patient per Anesthesia Guidelines   ELDERBERRY PO Instructed patient per Anesthesia Guidelines   ibuprofen (MOTRIN) 600 mg tablet Instructed patient per Anesthesia Guidelines   tamsulosin (FLOMAX) 0 4 mg Instructed patient per Anesthesia Guidelines   traMADol (ULTRAM) 50 mg tablet Instructed patient per Anesthesia Guidelines  Pre op instructions reviewed; verbalized understanding  Alpha-1 adrenergic blocker Med Class     Continue to take this medication on your normal schedule  If this is an oral medication and you take it in the morning, then you may take this medicine with a sip of water  Herbal Med Class     Stop taking this herbal medications at least one week prior to surgery/procedure  NSAID Med Class     Stop taking this medication at least 3 days prior to surgery/procedure  Opioid Med Class     Continue to take this medication on your normal schedule  If this is an oral medication and you take it in the morning, then you may take this medicine with a sip of water  Vitamin Med Class     You may continue to take any vitamin that your surgeon has prescribed to you up to the day before surgery  If your surgeon has not specifically prescribed this vitamin or instructed you to continue then stop taking 7 days prior to surgery

## 2020-09-01 ENCOUNTER — TELEPHONE (OUTPATIENT)
Dept: OTHER | Facility: HOSPITAL | Age: 40
End: 2020-09-01

## 2020-09-01 NOTE — TELEPHONE ENCOUNTER
Patient's wife called in regard to the stone's progress  She is asking if it has moved    Please call at [phone]  Thank you

## 2020-09-01 NOTE — TELEPHONE ENCOUNTER
Attempted call to patient to discuss results of CT scan, however voicemail left  Please inform patient results of CT stone study confirmed presence of 5 mm right mid ureteral calculus with hydronephrosis  He will proceed with surgery as scheduled and discussed at last office visit

## 2020-09-02 NOTE — TELEPHONE ENCOUNTER
Craig Clark is still located mid urethral   Proceed with surgery as scheduled on 9/8  Patient may still take Flomax, and call our office if he passes stone

## 2020-09-02 NOTE — TELEPHONE ENCOUNTER
Call placed to wife, left detailed message per communication consent form to advise that stone is still located mid urethral  Plan is for him to proceed as scheduled for surgery  Patient should continue Flomax, strain urine and call office if stone passes  Office number provided on voicemail for any questions

## 2020-09-07 ENCOUNTER — ANESTHESIA EVENT (OUTPATIENT)
Dept: PERIOP | Facility: HOSPITAL | Age: 40
End: 2020-09-07
Payer: COMMERCIAL

## 2020-09-07 PROBLEM — R73.03 PREDIABETES: Status: ACTIVE | Noted: 2020-09-07

## 2020-09-07 PROBLEM — E78.5 HYPERLIPIDEMIA: Status: ACTIVE | Noted: 2020-09-07

## 2020-09-07 NOTE — ANESTHESIA PREPROCEDURE EVALUATION
Procedure:  CYSTOSCOPY URETEROSCOPY WITH LITHOTRIPSY HOLMIUM LASER, RETROGRADE PYELOGRAM AND INSERTION STENT URETERAL (Right Bladder)    Relevant Problems   ANESTHESIA (within normal limits)   (-) History of anesthesia complications      CARDIO   (+) Hyperlipidemia      ENDO (within normal limits)      GI/HEPATIC (within normal limits)      /RENAL   (+) Hydronephrosis of right kidney      HEMATOLOGY (within normal limits)      MUSCULOSKELETAL (within normal limits)      NEURO/PSYCH (within normal limits)      PULMONARY (within normal limits)      Other   (+) Calculus of ureter   (+) Prediabetes        Physical Exam    Airway    Mallampati score: II  TM Distance: >3 FB  Neck ROM: full     Dental   No notable dental hx     Cardiovascular  Rhythm: regular, Rate: normal,     Pulmonary  Pulmonary exam normal Breath sounds clear to auscultation,     Other Findings        Anesthesia Plan  ASA Score- 2     Anesthesia Type- general with ASA Monitors  Additional Monitors:   Airway Plan: LMA  Plan Factors-Exercise tolerance (METS): >4 METS  Chart reviewed  Imaging results reviewed  Existing labs reviewed  Patient summary reviewed  Patient is not a current smoker  Patient did not smoke on day of surgery  Induction- intravenous  Postoperative Plan- Plan for postoperative opioid use  Planned trial extubation    Informed Consent- Anesthetic plan and risks discussed with patient and spouse  I personally reviewed this patient with the CRNA  Discussed and agreed on the Anesthesia Plan with the CRNA       NPO verified  NKDA  Plan:  GA, LMA    Risks and benefits discussed with patient  Questions answered  Patient consented

## 2020-09-08 ENCOUNTER — HOSPITAL ENCOUNTER (OUTPATIENT)
Dept: RADIOLOGY | Facility: HOSPITAL | Age: 40
Setting detail: OUTPATIENT SURGERY
Discharge: HOME | End: 2020-09-08
Payer: COMMERCIAL

## 2020-09-08 ENCOUNTER — APPOINTMENT (OUTPATIENT)
Dept: RADIOLOGY | Facility: HOSPITAL | Age: 40
End: 2020-09-08
Payer: COMMERCIAL

## 2020-09-08 ENCOUNTER — HOSPITAL ENCOUNTER (OUTPATIENT)
Facility: HOSPITAL | Age: 40
Setting detail: OUTPATIENT SURGERY
Discharge: HOME | End: 2020-09-08
Attending: UROLOGY | Admitting: UROLOGY
Payer: COMMERCIAL

## 2020-09-08 ENCOUNTER — ANESTHESIA (OUTPATIENT)
Dept: PERIOP | Facility: HOSPITAL | Age: 40
End: 2020-09-08
Payer: COMMERCIAL

## 2020-09-08 ENCOUNTER — TELEPHONE (OUTPATIENT)
Dept: UROLOGY | Facility: AMBULATORY SURGERY CENTER | Age: 40
End: 2020-09-08

## 2020-09-08 VITALS
OXYGEN SATURATION: 95 % | SYSTOLIC BLOOD PRESSURE: 126 MMHG | WEIGHT: 212 LBS | DIASTOLIC BLOOD PRESSURE: 70 MMHG | RESPIRATION RATE: 16 BRPM | HEART RATE: 67 BPM | HEIGHT: 69 IN | TEMPERATURE: 97.8 F | BODY MASS INDEX: 31.4 KG/M2

## 2020-09-08 VITALS — HEART RATE: 65 BPM

## 2020-09-08 DIAGNOSIS — N20.1 URETERAL CALCULUS: ICD-10-CM

## 2020-09-08 DIAGNOSIS — N20.1 CALCULUS OF URETER: Primary | ICD-10-CM

## 2020-09-08 PROCEDURE — C1758 CATHETER, URETERAL: HCPCS | Performed by: UROLOGY

## 2020-09-08 PROCEDURE — C1769 GUIDE WIRE: HCPCS | Performed by: UROLOGY

## 2020-09-08 PROCEDURE — 82360 CALCULUS ASSAY QUANT: CPT | Performed by: UROLOGY

## 2020-09-08 PROCEDURE — 52332 CYSTOSCOPY AND TREATMENT: CPT | Performed by: UROLOGY

## 2020-09-08 PROCEDURE — 52352 CYSTOURETERO W/STONE REMOVE: CPT | Performed by: UROLOGY

## 2020-09-08 PROCEDURE — C2617 STENT, NON-COR, TEM W/O DEL: HCPCS | Performed by: UROLOGY

## 2020-09-08 PROCEDURE — 74420 UROGRAPHY RTRGR +-KUB: CPT

## 2020-09-08 DEVICE — INLAY OPTIMA URETERAL STENT W/O GUIDEWIRE
Type: IMPLANTABLE DEVICE | Status: FUNCTIONAL
Brand: BARD® INLAY OPTIMA® URETERAL STENT

## 2020-09-08 RX ORDER — CEPHALEXIN 500 MG/1
500 CAPSULE ORAL EVERY 12 HOURS SCHEDULED
Qty: 6 CAPSULE | Refills: 0 | Status: SHIPPED | OUTPATIENT
Start: 2020-09-08 | End: 2020-09-11

## 2020-09-08 RX ORDER — ACETAMINOPHEN 500 MG
1000 TABLET ORAL EVERY 6 HOURS PRN
COMMUNITY

## 2020-09-08 RX ORDER — HYDROCODONE BITARTRATE AND ACETAMINOPHEN 5; 325 MG/1; MG/1
1 TABLET ORAL EVERY 4 HOURS PRN
Qty: 5 TABLET | Refills: 0 | Status: SHIPPED | OUTPATIENT
Start: 2020-09-08 | End: 2020-09-10

## 2020-09-08 RX ORDER — SODIUM CHLORIDE, SODIUM LACTATE, POTASSIUM CHLORIDE, CALCIUM CHLORIDE 600; 310; 30; 20 MG/100ML; MG/100ML; MG/100ML; MG/100ML
100 INJECTION, SOLUTION INTRAVENOUS CONTINUOUS
Status: DISCONTINUED | OUTPATIENT
Start: 2020-09-08 | End: 2020-09-08 | Stop reason: HOSPADM

## 2020-09-08 RX ORDER — CEFAZOLIN SODIUM 2 G/50ML
2000 SOLUTION INTRAVENOUS ONCE
Status: COMPLETED | OUTPATIENT
Start: 2020-09-08 | End: 2020-09-08

## 2020-09-08 RX ORDER — DEXAMETHASONE SODIUM PHOSPHATE 10 MG/ML
INJECTION, SOLUTION INTRAMUSCULAR; INTRAVENOUS AS NEEDED
Status: DISCONTINUED | OUTPATIENT
Start: 2020-09-08 | End: 2020-09-08

## 2020-09-08 RX ORDER — FENTANYL CITRATE 50 UG/ML
INJECTION, SOLUTION INTRAMUSCULAR; INTRAVENOUS AS NEEDED
Status: DISCONTINUED | OUTPATIENT
Start: 2020-09-08 | End: 2020-09-08

## 2020-09-08 RX ORDER — HYDROCODONE BITARTRATE AND ACETAMINOPHEN 5; 325 MG/1; MG/1
1 TABLET ORAL EVERY 6 HOURS PRN
Status: DISCONTINUED | OUTPATIENT
Start: 2020-09-08 | End: 2020-09-08 | Stop reason: HOSPADM

## 2020-09-08 RX ORDER — SODIUM CHLORIDE, SODIUM LACTATE, POTASSIUM CHLORIDE, CALCIUM CHLORIDE 600; 310; 30; 20 MG/100ML; MG/100ML; MG/100ML; MG/100ML
INJECTION, SOLUTION INTRAVENOUS CONTINUOUS PRN
Status: DISCONTINUED | OUTPATIENT
Start: 2020-09-08 | End: 2020-09-08

## 2020-09-08 RX ORDER — PROPOFOL 10 MG/ML
INJECTION, EMULSION INTRAVENOUS AS NEEDED
Status: DISCONTINUED | OUTPATIENT
Start: 2020-09-08 | End: 2020-09-08

## 2020-09-08 RX ORDER — FENTANYL CITRATE/PF 50 MCG/ML
25 SYRINGE (ML) INJECTION
Status: DISCONTINUED | OUTPATIENT
Start: 2020-09-08 | End: 2020-09-08 | Stop reason: HOSPADM

## 2020-09-08 RX ORDER — ONDANSETRON 2 MG/ML
4 INJECTION INTRAMUSCULAR; INTRAVENOUS ONCE AS NEEDED
Status: DISCONTINUED | OUTPATIENT
Start: 2020-09-08 | End: 2020-09-08 | Stop reason: HOSPADM

## 2020-09-08 RX ORDER — SODIUM CHLORIDE, SODIUM LACTATE, POTASSIUM CHLORIDE, CALCIUM CHLORIDE 600; 310; 30; 20 MG/100ML; MG/100ML; MG/100ML; MG/100ML
75 INJECTION, SOLUTION INTRAVENOUS CONTINUOUS
Status: DISCONTINUED | OUTPATIENT
Start: 2020-09-08 | End: 2020-09-08 | Stop reason: HOSPADM

## 2020-09-08 RX ORDER — MIDAZOLAM HYDROCHLORIDE 2 MG/2ML
INJECTION, SOLUTION INTRAMUSCULAR; INTRAVENOUS AS NEEDED
Status: DISCONTINUED | OUTPATIENT
Start: 2020-09-08 | End: 2020-09-08

## 2020-09-08 RX ORDER — ONDANSETRON 2 MG/ML
INJECTION INTRAMUSCULAR; INTRAVENOUS AS NEEDED
Status: DISCONTINUED | OUTPATIENT
Start: 2020-09-08 | End: 2020-09-08

## 2020-09-08 RX ORDER — MAGNESIUM HYDROXIDE 1200 MG/15ML
LIQUID ORAL AS NEEDED
Status: DISCONTINUED | OUTPATIENT
Start: 2020-09-08 | End: 2020-09-08 | Stop reason: HOSPADM

## 2020-09-08 RX ORDER — LIDOCAINE HYDROCHLORIDE 10 MG/ML
0.5 INJECTION, SOLUTION EPIDURAL; INFILTRATION; INTRACAUDAL; PERINEURAL ONCE AS NEEDED
Status: DISCONTINUED | OUTPATIENT
Start: 2020-09-08 | End: 2020-09-08 | Stop reason: HOSPADM

## 2020-09-08 RX ORDER — LIDOCAINE HYDROCHLORIDE 10 MG/ML
INJECTION, SOLUTION EPIDURAL; INFILTRATION; INTRACAUDAL; PERINEURAL AS NEEDED
Status: DISCONTINUED | OUTPATIENT
Start: 2020-09-08 | End: 2020-09-08

## 2020-09-08 RX ADMIN — MIDAZOLAM 2 MG: 1 INJECTION INTRAMUSCULAR; INTRAVENOUS at 13:47

## 2020-09-08 RX ADMIN — FENTANYL CITRATE 25 MCG: 50 INJECTION, SOLUTION INTRAMUSCULAR; INTRAVENOUS at 14:08

## 2020-09-08 RX ADMIN — FENTANYL CITRATE 25 MCG: 50 INJECTION, SOLUTION INTRAMUSCULAR; INTRAVENOUS at 14:14

## 2020-09-08 RX ADMIN — DEXAMETHASONE SODIUM PHOSPHATE 5 MG: 10 INJECTION, SOLUTION INTRAMUSCULAR; INTRAVENOUS at 13:58

## 2020-09-08 RX ADMIN — Medication 25 MCG: at 14:48

## 2020-09-08 RX ADMIN — Medication 25 MCG: at 14:42

## 2020-09-08 RX ADMIN — LIDOCAINE HYDROCHLORIDE 50 MG: 10 INJECTION, SOLUTION EPIDURAL; INFILTRATION; INTRACAUDAL; PERINEURAL at 13:54

## 2020-09-08 RX ADMIN — ONDANSETRON 4 MG: 2 INJECTION INTRAMUSCULAR; INTRAVENOUS at 14:21

## 2020-09-08 RX ADMIN — PROPOFOL 200 MG: 10 INJECTION, EMULSION INTRAVENOUS at 13:54

## 2020-09-08 RX ADMIN — SODIUM CHLORIDE, SODIUM LACTATE, POTASSIUM CHLORIDE, AND CALCIUM CHLORIDE 100 ML/HR: .6; .31; .03; .02 INJECTION, SOLUTION INTRAVENOUS at 12:35

## 2020-09-08 RX ADMIN — FENTANYL CITRATE 25 MCG: 50 INJECTION, SOLUTION INTRAMUSCULAR; INTRAVENOUS at 13:58

## 2020-09-08 RX ADMIN — CEFAZOLIN SODIUM 2000 MG: 2 SOLUTION INTRAVENOUS at 13:57

## 2020-09-08 RX ADMIN — FENTANYL CITRATE 25 MCG: 50 INJECTION, SOLUTION INTRAMUSCULAR; INTRAVENOUS at 14:02

## 2020-09-08 NOTE — OP NOTE
OPERATIVE REPORT  PATIENT NAME: Sadi Sherman    :  1980  MRN: [id number]  Pt Location: [facility]    SURGERY DATE: 2020    Surgeon(s) and Role:     Britney Franklin MD - Primary    Preop Diagnosis:  Ureteral calculus [N20 1]    Post-Op Diagnosis Codes:     * Ureteral calculus [N20 1]    Procedure(s) (LRB):  CYSTOSCOPY URETEROSCOPY, RETROGRADE PYELOGRAM, BASKET STONE EXTRACTION AND INSERTION STENT URETERAL (Right)    Specimen(s):  ID Type Source Tests Collected by Time Destination   A :  Calculus Ureter, Right STONE ANALYSIS Alvina Humphrey MD 2020 1414        Estimated Blood Loss:   Minimal    Drains:  Ureteral Drain/Stent Right ureter 7 Fr  (Active)   Number of days: 377       Anesthesia Type:   General    Operative Indications:  Ureteral calculus [N20 1]      Operative Findings:  Stone displaced to kidney midpole calyx  Successfully retrieved  Significant right hydronephrosis  Complications:   None    Procedure and Technique:  The patient was identified, brought to the operating room, and placed on the table in supine position  After induction of general anesthesia, the patient was placed in dorsal lithotomy position and prepped and draped in the usual sterile fashion  A complete formal timeout was performed  The 25 [place] rigid cystoscope was placed per urethra and cystoscopy was performed  There was no bladder abnormality identified  The right ureteral orifice was cannulated with a Solo wire  A long semi rigid ureteral scope was placed and with the help of a second guidewire, passed easily into the proximal ureter  There is no evidence of stricture or obstruction noted  Gerry Grams was not visualized  As patient did not think he passed a stone, I then passed a ureteral access sheath  The 8 5 [place]ian flexible ureteroscope was placed and the stone was identified within a mid pole renal calyx    As it was not large, a zero tip stone basket was placed and the stone was retrieved  At this point, a retrograde pyelogram was performed delineating the upper urinary tract anatomy  There is significant right hydronephrosis noted  The safety wire was backloaded into the cystoscope and a 28 cm x 6 Portuguese double-J stent was placed without string as per patient's strong request      The patient tolerated the procedure well and was transferred to the recovery room awake alert and in stable condition       I was present for the entire procedure    Patient Disposition:  PACU     SIGNATURE: Simran Anne MD  DATE: September 8, 2020  TIME: 2:30 PM

## 2020-09-08 NOTE — TELEPHONE ENCOUNTER
Post Op Note    Kyle Dugan is a 38 y o  male s/p CYSTOSCOPY URETEROSCOPY, RETROGRADE PYELOGRAM, BASKET STONE EXTRACTION AND INSERTION STENT URETERAL (Right)  performed 9/8/2020  Kyle Dugan is a patient of the [place] office  Patient still admitted at this time  Per Dr Mark Licea, patient has stent with no string  He is to be scheduled for cysto/stent removal in about one week with any provider at any office  Will contact patient tomorrow to discuss

## 2020-09-08 NOTE — INTERVAL H&P NOTE
H&P reviewed  After examining the patient I find no changes in the patients condition since the H&P had been written  Vitals:    09/08/20 1213   BP: 126/79   Pulse: 79   Resp: 16   Temp: 99 1 °F (37 3 °C)   SpO2: 98%     CT stone study revealed persistent right mid ureteral calculus  Patient has had procedure in the past   Will proceed as planned with right ureteroscopy, laser, stone extraction, stent placement  All questions answered and consent was obtained

## 2020-09-08 NOTE — ANESTHESIA POSTPROCEDURE EVALUATION
Post-Op Assessment Note    CV Status:  Stable    Pain management: adequate     Mental Status:  Alert and awake   Hydration Status:  Euvolemic   PONV Controlled:  Controlled   Airway Patency:  Patent      Post Op Vitals Reviewed: Yes      Staff: CRNA         No complications documented      BP   122/78   Temp   98   Pulse  70   Resp   18   SpO2   99

## 2020-09-08 NOTE — PROGRESS NOTES
Assumed care of patient at 1531 hours  Report received from [facility]  in  [address]  Patient in supine position with HOB elevated to 60 degrees  In NAD  Pt's spouse Steven Wiggins at bedside  To D/C home after voids

## 2020-09-09 NOTE — TELEPHONE ENCOUNTER
Post Op Note     Staci Brennan is a 38 y o  male s/p CYSTOSCOPY URETEROSCOPY, RETROGRADE PYELOGRAM, BASKET STONE EXTRACTION AND INSERTION STENT URETERAL (Right)  performed 9/8/2020  Staci Brennan is a patient of the [place] office       How would you rate your pain on a scale from 1 to 10, 10 being the worst pain ever? 3  Have you had a fever? No  Have your bowel movements been regular? Yes  Do you have any difficulty urinating? No    Do you have any other questions or concerns that I can address at this time? No     Per Dr Praful Gonsalves, patient has stent with no string  He is to be scheduled for cysto/stent removal in about one week with any provider at any office  Scheduled for 9/15 at [facility] office  Patient to schedule his follow up at that time  Reviewed stent symptoms including flank pain, nausea, dysuria, and hematuria  Instructed to increase PO fluids  Take NSAIDS and other prescribed pain medication PRN  Encouraged to call with for uncontrolled pain and fever  Patient verbalized understanding

## 2020-09-15 ENCOUNTER — PROCEDURE VISIT (OUTPATIENT)
Dept: UROLOGY | Facility: MEDICAL CENTER | Age: 40
End: 2020-09-15
Payer: COMMERCIAL

## 2020-09-15 VITALS — WEIGHT: 212 LBS | HEIGHT: 69 IN | BODY MASS INDEX: 31.4 KG/M2 | TEMPERATURE: 96.9 F

## 2020-09-15 DIAGNOSIS — N20.1 URETERAL CALCULUS: ICD-10-CM

## 2020-09-15 DIAGNOSIS — N20.0 NEPHROLITHIASIS: Primary | ICD-10-CM

## 2020-09-15 DIAGNOSIS — N13.30 HYDRONEPHROSIS OF RIGHT KIDNEY: ICD-10-CM

## 2020-09-15 DIAGNOSIS — Z46.6 ENCOUNTER FOR REMOVAL OF URETERAL STENT: ICD-10-CM

## 2020-09-15 LAB
SL AMB  POCT GLUCOSE, UA: ABNORMAL
SL AMB LEUKOCYTE ESTERASE,UA: ABNORMAL
SL AMB POCT BILIRUBIN,UA: ABNORMAL
SL AMB POCT BLOOD,UA: ABNORMAL
SL AMB POCT CLARITY,UA: CLEAR
SL AMB POCT COLOR,UA: YELLOW
SL AMB POCT KETONES,UA: ABNORMAL
SL AMB POCT NITRITE,UA: ABNORMAL
SL AMB POCT PH,UA: 6
SL AMB POCT SPECIFIC GRAVITY,UA: 1.02
SL AMB POCT URINE PROTEIN: 30
SL AMB POCT UROBILINOGEN: 0.2

## 2020-09-15 PROCEDURE — 99214 OFFICE O/P EST MOD 30 MIN: CPT | Performed by: UROLOGY

## 2020-09-15 PROCEDURE — 52310 CYSTOSCOPY AND TREATMENT: CPT | Performed by: UROLOGY

## 2020-09-15 PROCEDURE — 81003 URINALYSIS AUTO W/O SCOPE: CPT | Performed by: UROLOGY

## 2020-09-15 NOTE — PROGRESS NOTES
Assessment/Plan:      Diagnoses and all orders for this visit:    Nephrolithiasis  -     POCT urine dip auto non-scope    Ureteral calculus    Hydronephrosis of right kidney          Subjective:  Right ureteral stent     Patient ID: Allison Shirley is a 38 y o  male  HPI   the patient is about a week after undergoing a cystoscopy ureteroscopy and laser lithotripsy of her right ureteral stone  He is here today for cystoscopy and stent removal   He denies any dysuria , fevers, chills, nausea, vomiting or flank pains  Review of Systems   Constitutional: Negative  Negative for fever  HENT: Negative  Eyes: Negative  Respiratory: Negative  Cardiovascular: Negative  Gastrointestinal: Negative  Endocrine: Negative  Genitourinary: Positive for dysuria  Negative for flank pain and hematuria  Musculoskeletal: Negative  Skin: Negative  Allergic/Immunologic: Negative  Neurological: Negative  Hematological: Negative  Psychiatric/Behavioral: Negative  Objective:     Physical Exam  Vitals signs and nursing note reviewed  Constitutional:       General: He is not in acute distress  Appearance: He is well-developed  HENT:      Head: Normocephalic and atraumatic  Eyes:      General: No scleral icterus  Conjunctiva/sclera: Conjunctivae normal       Pupils: Pupils are equal, round, and reactive to light  Neck:      Musculoskeletal: Normal range of motion and neck supple  Cardiovascular:      Rate and Rhythm: Normal rate  Pulmonary:      Effort: Pulmonary effort is normal  No respiratory distress  Breath sounds: Normal breath sounds  Abdominal:      General: Bowel sounds are normal  There is no distension  Palpations: Abdomen is soft  There is no mass  Tenderness: There is no abdominal tenderness  Genitourinary:     Penis: Normal     Musculoskeletal: Normal range of motion  General: No tenderness     Skin:     General: Skin is warm and dry  Coloration: Skin is not pale  Findings: No erythema or rash  Neurological:      Mental Status: He is alert and oriented to person, place, and time  Cranial Nerves: No cranial nerve deficit  Psychiatric:         Behavior: Behavior normal          Thought Content:  Thought content normal          Judgment: Judgment normal           refer to cystoscopy procedure note

## 2020-09-15 NOTE — PROGRESS NOTES
Cystoscopy    Date/Time: 9/15/2020 9:44 AM  Performed by: Diane Crawley MD  Authorized by: Diane Crawley MD     Procedure details: simple removal of a foreign body, stone, or stent    Patient tolerance: Patient tolerated the procedure well with no immediate complications         the patient was placed supine on the procedure table and prepped and draped in penoscrotal area in the usual manner  A lubricated 16 Citizen of [place] flexible cystoscope was inserted per urethra where there was no evidence of stricture or obstruction  His prostatic urethra was nonocclusive  The bladder was entered and pan cystoscoped  The right ureteral stent was visualized grasped and extracted with removal of the cystoscope  Tolerated procedure well

## 2020-09-21 LAB
CA CARBONATE CRY STONE QL IR: 6 %
CALCIUM OXALATE DIHYDRATE MFR STONE IR: 15 %
COLOR STONE: NORMAL
COM MFR STONE: 65 %
COMMENT-STONE3: NORMAL
COMPOSITION: NORMAL
HYDROXYAPATITE 24H ENGDIFF UR: 14 %
LABORATORY COMMENT REPORT: NORMAL
PHOTO: NORMAL
SIZE STONE: NORMAL MM
SPEC SOURCE SUBJ: NORMAL
STONE ANALYSIS-IMP: NORMAL
STONE ANALYSIS-IMP: NORMAL
WT STONE: 41 MG

## 2020-12-15 ENCOUNTER — OFFICE VISIT (OUTPATIENT)
Dept: UROLOGY | Facility: AMBULATORY SURGERY CENTER | Age: 40
End: 2020-12-15
Payer: COMMERCIAL

## 2020-12-15 VITALS
BODY MASS INDEX: 32.23 KG/M2 | HEIGHT: 69 IN | WEIGHT: 217.6 LBS | HEART RATE: 85 BPM | DIASTOLIC BLOOD PRESSURE: 90 MMHG | SYSTOLIC BLOOD PRESSURE: 134 MMHG

## 2020-12-15 DIAGNOSIS — N20.0 NEPHROLITHIASIS: Primary | ICD-10-CM

## 2020-12-15 PROCEDURE — 99213 OFFICE O/P EST LOW 20 MIN: CPT | Performed by: NURSE PRACTITIONER

## 2021-06-19 ENCOUNTER — HOSPITAL ENCOUNTER (OUTPATIENT)
Dept: RADIOLOGY | Facility: HOSPITAL | Age: 41
Discharge: HOME | End: 2021-06-19
Payer: COMMERCIAL

## 2021-06-19 DIAGNOSIS — N20.0 NEPHROLITHIASIS: ICD-10-CM

## 2021-06-19 PROCEDURE — 76770 US EXAM ABDO BACK WALL COMP: CPT

## 2021-06-19 PROCEDURE — 74018 RADEX ABDOMEN 1 VIEW: CPT

## 2021-06-21 ENCOUNTER — TELEPHONE (OUTPATIENT)
Dept: UROLOGY | Facility: AMBULATORY SURGERY CENTER | Age: 41
End: 2021-06-21

## 2021-06-21 NOTE — TELEPHONE ENCOUNTER
----- Message from David Dukes 78  sent at 6/21/2021  9:08 AM EDT -----   Elias Callahan to notify patient of unremarkable ultrasound of kidney and bladder

## 2021-07-01 ENCOUNTER — TELEPHONE (OUTPATIENT)
Dept: UROLOGY | Facility: CLINIC | Age: 41
End: 2021-07-01

## 2021-07-01 NOTE — TELEPHONE ENCOUNTER
----- Message from David Daly  sent at 6/27/2021 10:43 AM EDT -----    Migue Alatorre to notify patient of no change to his stone in his left upper kidney  He continues to be approximately 3 mm in size  No intervention is needed at this time  He should maintain adequate intake of water per day  Add lemon to water  Call the office for questions or concerns

## 2021-07-01 NOTE — TELEPHONE ENCOUNTER
Left a voicemail to have Lovelace call back and speak to clinical staff regarding KUB xray results  Office phone number given  Please see notes from [address] [place] [address] Avenue with follow up recommendations

## 2021-07-02 NOTE — TELEPHONE ENCOUNTER
Patient and patient wife calling for results of kub  Patient will only have access to phone until noon today    Please call [phone]

## 2021-07-02 NOTE — TELEPHONE ENCOUNTER
Called and spoke with both Ananya and his wife Pamela Jacobson Notified of no change to his stone in his left upper kidney  approximately 3 mm in size  No intervention is needed at this time  He should maintain adequate intake of water per day  Add lemon to water  They understood

## 2022-10-03 NOTE — TELEPHONE ENCOUNTER
Call returned to wife per communication consent form, advised per provider that patient does not require any additional imaging at this time  Advised patient should keep appointment as scheduled to discuss further, continue Flomax and increased water intake  Wife verbalized understanding and agrees with plan  Cephalexin Counseling: I counseled the patient regarding use of cephalexin as an antibiotic for prophylactic and/or therapeutic purposes. Cephalexin (commonly prescribed under brand name Keflex) is a cephalosporin antibiotic which is active against numerous classes of bacteria, including most skin bacteria. Side effects may include nausea, diarrhea, gastrointestinal upset, rash, hives, yeast infections, and in rare cases, hepatitis, kidney disease, seizures, fever, confusion, neurologic symptoms, and others. Patients with severe allergies to penicillin medications are cautioned that there is about a 10% incidence of cross-reactivity with cephalosporins. When possible, patients with penicillin allergies should use alternatives to cephalosporins for antibiotic therapy.

## 2023-10-27 NOTE — DISCHARGE INSTRUCTIONS
Colocación de catéter ureteral   LO QUE NECESITA SABER:   La colocación del catéter ureteral es un procedimiento que se realiza para abrir un uréter angosto o bloqueado  El uréter es el tubo que transporta la orina desde el Garrie Nie[place] a la vejiga  El catéter es un tubo plástico muir hueco que se [place] para mantener abierto el uréter y así permitir el flujo de [place]  El catéter podría permanecer instalado por varias semanas  INSTRUCCIONES SOBRE [facility]:   Medicamentos:   · Los analgésicos  podrían administrarse para quitar o disminuir el dolor  No espere a que el dolor sea muy intenso para daron el medicamento  · Antibióticos  ayudan a prevenir infecciones  Patton médico podría recetarle antibióticos mientras tenga colocado el catéter  · [place] medicamentos papa se le haya indicado  Consulte con patton médico si usted estefania que patton medicamento no le está ayudando o si presenta efectos secundarios  Infórmele si es alérgico a cualquier medicamento  Mantenga ericka lista actualizada de los [employer], las vitaminas y los productos herbales que leroy  Incluya los siguientes datos de los medicamentos: cantidad, frecuencia y motivo de administración  Traiga con usted la lista o los envases de la píldoras a [place] citas de seguimiento  Lleve la lista de los medicamentos con usted en lilo de ericka emergencia  Programe ericka chencho con patton urólogo papa se le indique:  Usted necesitará citas de seguimiento regulares con patton urólogo sun el tiempo que tenga puesto el catéter  El urólogo lo revisará y se asegurará de que el catéter esté funcionando correctamente  Es probable que le ritika algunas pruebas de [place] para gael si hay infección  Anote india preguntas para que se acuerde de hacerlas sun india visitas  Cuidados personales:   · [address]  Pregunte a patton médico sobre la cantidad de líquido que necesita daron todos los días y cuáles le recomienda   Bebidas papa jugos de arándanos o [place] podrían ser de mucha utilidad en la prevención de infecciones urinarias  · Regrese a ind[place] actividades regulares  el día después de que le coloquen el catéter o según las indicaciones de navarro médico      · Usted puede bañarse  el día después de colocado el catéter, si navarro médico lo autoriza a hacerlo  Comuníquese con navarro médico o urólogo si:   · Usted tiene fiebre o escalofríos  · Usted siente que necesita orinar con frecuencia  · Usted tiene dolor cuando orina o dolor alrededor de la vejiga o el [place]  · Usted nota le en la orina o la [place] se ve turbia  · Usted tiene preguntas o inquietudes acerca de navarro condición o cuidado  Busque atención médica de inmediato o llame al 911 si:   · Usted orina poco o no orina nada  · Usted tiene dolor abdominal intenso  © 2017 [address] [employer] Information is for End User's use only and may not be sold, redistributed or otherwise used for commercial purposes  All illustrations and images included in CareNotes® are the copyrighted property of A D A M , Inc  or Iván Julio  Esta información es sólo para uso en educación  Navarro intención no es darle un consejo médico sobre enfermedades o tratamientos  Colsulte con navarro Molly Verenauts farmacéutico antes de seguir cualquier régimen médico para saber si es seguro y efectivo para usted  Include Size Of Lesion In Location Indication Statement: Yes

## 2024-04-08 ENCOUNTER — HOSPITAL ENCOUNTER (EMERGENCY)
Facility: HOSPITAL | Age: 44
Discharge: HOME | End: 2024-04-08
Attending: EMERGENCY MEDICINE | Admitting: EMERGENCY MEDICINE
Payer: COMMERCIAL

## 2024-04-08 VITALS
HEART RATE: 84 BPM | OXYGEN SATURATION: 99 % | BODY MASS INDEX: 36.14 KG/M2 | RESPIRATION RATE: 16 BRPM | WEIGHT: 244.71 LBS

## 2024-04-08 DIAGNOSIS — R42 LIGHTHEADEDNESS: Primary | ICD-10-CM

## 2024-04-08 DIAGNOSIS — T50.905A ADVERSE EFFECT OF DRUG, INITIAL ENCOUNTER: ICD-10-CM

## 2024-04-08 LAB
ALBUMIN SERPL BCP-MCNC: 4.5 G/DL (ref 3.5–5)
ALP SERPL-CCNC: 72 U/L (ref 34–104)
ALT SERPL W P-5'-P-CCNC: 39 U/L (ref 7–52)
AMPHETAMINES SERPL QL SCN: NEGATIVE
ANION GAP SERPL CALCULATED.3IONS-SCNC: 7 MMOL/L (ref 4–13)
AST SERPL W P-5'-P-CCNC: 27 U/L (ref 13–39)
ATRIAL RATE: 71 BPM
BARBITURATES UR QL: NEGATIVE
BASOPHILS # BLD AUTO: 0.02 THOUSANDS/ÂΜL (ref 0–0.1)
BASOPHILS NFR BLD AUTO: 0 % (ref 0–1)
BENZODIAZ UR QL: NEGATIVE
BILIRUB SERPL-MCNC: 0.5 MG/DL (ref 0.2–1)
BILIRUB UR QL STRIP: NEGATIVE
BUN SERPL-MCNC: 12 MG/DL (ref 5–25)
CALCIUM SERPL-MCNC: 9.9 MG/DL (ref 8.4–10.2)
CHLORIDE SERPL-SCNC: 100 MMOL/L (ref 96–108)
CLARITY UR: CLEAR
CO2 SERPL-SCNC: 28 MMOL/L (ref 21–32)
COCAINE UR QL: NEGATIVE
COLOR UR: NORMAL
CREAT SERPL-MCNC: 1.07 MG/DL (ref 0.6–1.3)
EOSINOPHIL # BLD AUTO: 0.08 THOUSAND/ÂΜL (ref 0–0.61)
EOSINOPHIL NFR BLD AUTO: 1 % (ref 0–6)
ERYTHROCYTE [DISTWIDTH] IN BLOOD BY AUTOMATED COUNT: 14 % (ref 11.6–15.1)
FENTANYL UR QL SCN: NEGATIVE
GFR SERPL CREATININE-BSD FRML MDRD: 83 ML/MIN/1.73SQ M
GLUCOSE SERPL-MCNC: 96 MG/DL (ref 65–140)
GLUCOSE UR STRIP-MCNC: NEGATIVE MG/DL
HCT VFR BLD AUTO: 45.1 % (ref 36.5–49.3)
HGB BLD-MCNC: 15.3 G/DL (ref 12–17)
HGB UR QL STRIP.AUTO: NEGATIVE
HYDROCODONE UR QL SCN: NEGATIVE
IMM GRANULOCYTES # BLD AUTO: 0.01 THOUSAND/UL (ref 0–0.2)
IMM GRANULOCYTES NFR BLD AUTO: 0 % (ref 0–2)
KETONES UR STRIP-MCNC: NEGATIVE MG/DL
LEUKOCYTE ESTERASE UR QL STRIP: NEGATIVE
LYMPHOCYTES # BLD AUTO: 3.68 THOUSANDS/ÂΜL (ref 0.6–4.47)
LYMPHOCYTES NFR BLD AUTO: 52 % (ref 14–44)
MCH RBC QN AUTO: 30.8 PG (ref 26.8–34.3)
MCHC RBC AUTO-ENTMCNC: 33.9 G/DL (ref 31.4–37.4)
MCV RBC AUTO: 91 FL (ref 82–98)
METHADONE UR QL: NEGATIVE
MONOCYTES # BLD AUTO: 0.62 THOUSAND/ÂΜL (ref 0.17–1.22)
MONOCYTES NFR BLD AUTO: 9 % (ref 4–12)
NEUTROPHILS # BLD AUTO: 2.7 THOUSANDS/ÂΜL (ref 1.85–7.62)
NEUTS SEG NFR BLD AUTO: 38 % (ref 43–75)
NITRITE UR QL STRIP: NEGATIVE
NRBC BLD AUTO-RTO: 0 /100 WBCS
OPIATES UR QL SCN: NEGATIVE
OXYCODONE+OXYMORPHONE UR QL SCN: NEGATIVE
P AXIS: 44 DEGREES
PCP UR QL: NEGATIVE
PH UR STRIP.AUTO: 7 [PH]
PLATELET # BLD AUTO: 186 THOUSANDS/UL (ref 149–390)
PMV BLD AUTO: 10.8 FL (ref 8.9–12.7)
POTASSIUM SERPL-SCNC: 4.6 MMOL/L (ref 3.5–5.3)
PR INTERVAL: 170 MS
PROT SERPL-MCNC: 7.5 G/DL (ref 6.4–8.4)
PROT UR STRIP-MCNC: NEGATIVE MG/DL
QRS AXIS: 31 DEGREES
QRSD INTERVAL: 88 MS
QT INTERVAL: 362 MS
QTC INTERVAL: 393 MS
RBC # BLD AUTO: 4.97 MILLION/UL (ref 3.88–5.62)
SODIUM SERPL-SCNC: 135 MMOL/L (ref 135–147)
SP GR UR STRIP.AUTO: 1.01 (ref 1–1.04)
T WAVE AXIS: 21 DEGREES
THC UR QL: NEGATIVE
UROBILINOGEN UA: NEGATIVE MG/DL
VENTRICULAR RATE: 71 BPM
WBC # BLD AUTO: 7.11 THOUSAND/UL (ref 4.31–10.16)

## 2024-04-08 PROCEDURE — 93005 ELECTROCARDIOGRAM TRACING: CPT

## 2024-04-08 PROCEDURE — 80307 DRUG TEST PRSMV CHEM ANLYZR: CPT | Performed by: EMERGENCY MEDICINE

## 2024-04-08 PROCEDURE — 80053 COMPREHEN METABOLIC PANEL: CPT | Performed by: EMERGENCY MEDICINE

## 2024-04-08 PROCEDURE — 36415 COLL VENOUS BLD VENIPUNCTURE: CPT | Performed by: EMERGENCY MEDICINE

## 2024-04-08 PROCEDURE — 99284 EMERGENCY DEPT VISIT MOD MDM: CPT | Performed by: EMERGENCY MEDICINE

## 2024-04-08 PROCEDURE — 85025 COMPLETE CBC W/AUTO DIFF WBC: CPT | Performed by: EMERGENCY MEDICINE

## 2024-04-08 PROCEDURE — 93010 ELECTROCARDIOGRAM REPORT: CPT | Performed by: INTERNAL MEDICINE

## 2024-04-08 NOTE — ED NOTES
IV placement attempted x2, charge nurse and provider made aware of unsuccessful placement.      Yoly Onofre RN  04/08/24 [id number]

## 2024-04-08 NOTE — ED NOTES
Assumed care for pt. Pt currently resting on stretcher with no s/s of distress observed.      Yoly Onofre RN  04/08/24 [id number]

## 2024-04-08 NOTE — ED PROVIDER NOTES
"History  No chief complaint on file.    43-year-old gentleman presents with complaint of dizziness/lightheadedness.  Reports that he took one of his wife's muscle relaxants (doesn't know the name).  Within an hour or two he then became dizzy.  He denies taking other medications recently although he does at times take Benadryl for allergies.  He describes feeling \"out of his head\".  Denies use of alcohol or drugs he denies any focal logical deficits, chest pain, difficulty breathing, or other acute issues or concerns at this time.        Dizziness  Quality:  Lightheadedness  Severity:  Mild  Onset quality:  Gradual  Duration: less than an hour.  Timing:  Constant  Progression:  Unchanged  Chronicity:  New  Context: medication    Relieved by:  Nothing  Worsened by:  Nothing  Ineffective treatments:  None tried  Associated symptoms: no chest pain, no headaches, no hearing loss, no nausea, no palpitations, no shortness of breath, no syncope, no tinnitus, no vision changes, no vomiting and no weakness        Prior to Admission Medications   Prescriptions Last Dose Informant Patient Reported? Taking?   Cholecalciferol (VITAMIN D3 GUMMIES ADULT PO)  Self Yes No   Sig: Take 2,000 Units by mouth daily   ELDERBERRY PO  Self Yes No   Sig: Take 1 tablet by mouth daily   acetaminophen (TYLENOL) 500 mg tablet  Self Yes No   Sig: Take 1,000 mg by mouth every 6 (six) hours as needed for mild pain   ibuprofen (MOTRIN) 600 mg tablet  Self Yes No   Sig: Take by mouth every 6 (six) hours as needed for mild pain   tamsulosin (FLOMAX) 0.4 mg  Self No No   Sig: Take 1 capsule (0.4 mg total) by mouth daily with dinner   Patient not taking: Reported on 9/15/2020   traMADol (ULTRAM) 50 mg tablet  Self No No   Sig: Take 1 tablet (50 mg total) by mouth every 8 (eight) hours as needed for moderate pain   Patient not taking: Reported on 9/15/2020      Facility-Administered Medications: None       Past Medical History:   Diagnosis Date    Chronic " kidney disease     KIDNEY STONES    Kidney stone        Past Surgical History:   Procedure Laterality Date    CYSTOSCOPY W/ LASER LITHOTRIPSY Right 8/24/2017    Procedure: CYSTOSCOPY URETEROSCOPY WITH LITHOTRIPSY HOLMIUM LASER, RETROGRADE PYELOGRAM AND INSERTION STENT URETERAL;  Surgeon: Benji José MD;  [facility];  Service: Urology    CYSTOSCOPY W/ LASER LITHOTRIPSY Right 9/14/2017    Procedure: CYSTOSCOPY; RETROGRADE PYELOGRAM; URETEROSCOPY WITH HOLMIUM LASER; stone extraction ,EXCHANGE OF RIGHT URETERAL STENT;  Surgeon: Benji José MD;  [facility];  Service: Urology    EXTRACORPOREAL SHOCK WAVE LITHOTRIPSY Right 8/10/2017    Procedure: LITHROTRIPSY EXTRACORPORAL SHOCKWAVE (ESWL) WITH CYSTOSCOPY AND INSERTION STENT URETERAL;  Surgeon: Benji José MD;  [facility];  Service: Urology    FL RETROGRADE PYELOGRAM  8/17/2018    FL RETROGRADE PYELOGRAM  9/8/2020    NH CYSTO/URETERO W/LITHOTRIPSY &INDWELL STENT INSRT Right 8/17/2018    Procedure: CYSTOSCOPY URETEROSCOPY, RETROGRADE PYELOGRAM AND INSERTION STENT URETERAL;  Surgeon: Omid Gilliland MD;  [facility];  Service: Urology    NH CYSTO/URETERO W/LITHOTRIPSY &INDWELL STENT INSRT Right 9/8/2020    Procedure: CYSTOSCOPY URETEROSCOPY, RETROGRADE PYELOGRAM, BASKET STONE EXTRACTION AND INSERTION STENT URETERAL;  Surgeon: Xavi Gonzales MD;  [facility];  Service: Urology    NH CYSTOURETHROSCOPY,URETER CATHETER Right 11/9/2017    Procedure: CYSTOSCOPY RETROGRADE PYELOGRAM WITH URETEROSCOPY WITH LASER AND RIGHT STENT EXCHANGE;  Surgeon: Benji José MD;  [facility];  Service: Urology    NH CYSTOURETHROSCOPY,URETER CATHETER N/A 1/4/2018    Procedure: CYSTOSCOPY, BILATERAL RETROGRADE PYELOGRAM WITH RIGHT URETEROSCOPY WITH HYDRODILATION OF STRICTURE , RIGHT STENT INSERTION X 2;  Surgeon: Benji José MD;  [facility];  Service: Urology       Family History   Problem Relation Age of Onset     Kidney disease Mother     Diabetes Mother     Hypertension Mother     Urolithiasis Mother     No Known Problems Father      I have reviewed and agree with the history as documented.    E-Cigarette/Vaping    E-Cigarette Use Never User      E-Cigarette/Vaping Substances    Nicotine No     THC No     CBD No     Flavoring No     Other No     Unknown No      Social History     Tobacco Use    Smoking status: Former    Smokeless tobacco: Never    Tobacco comments:     former smoker of occasional cigar   Vaping Use    Vaping status: Never Used   Substance Use Topics    Alcohol use: Yes     Alcohol/week: 4.0 - 7.0 standard drinks of alcohol     Types: 4 - 7 Cans of beer per week    Drug use: No       Review of Systems   HENT:  Negative for hearing loss and tinnitus.    Respiratory:  Negative for shortness of breath.    Cardiovascular:  Negative for chest pain, palpitations and syncope.   Gastrointestinal:  Negative for nausea and vomiting.   Neurological:  Positive for dizziness. Negative for weakness and headaches.       Physical Exam  Physical Exam  Vitals and nursing note reviewed.   Constitutional:       General: He is not in acute distress.     Appearance: Normal appearance. He is well-developed. He is not ill-appearing, toxic-appearing or diaphoretic.   HENT:      Head: Normocephalic and atraumatic.      Right Ear: External ear normal.      Left Ear: External ear normal.      Nose: Nose normal.      Mouth/Throat:      Mouth: Mucous membranes are moist.      Pharynx: Oropharynx is clear.   Eyes:      Conjunctiva/sclera:      Right eye: Right conjunctiva is injected.      Left eye: Left conjunctiva is injected.      Pupils: Pupils are equal, round, and reactive to light.   Cardiovascular:      Rate and Rhythm: Normal rate and regular rhythm.      Heart sounds: Normal heart sounds.   Pulmonary:      Effort: Pulmonary effort is normal. No respiratory distress.      Breath sounds: Normal breath sounds.   Abdominal:       General: Bowel sounds are normal. There is no distension.      Palpations: Abdomen is soft.      Tenderness: There is no abdominal tenderness. There is no guarding.   Musculoskeletal:         General: Normal range of motion.      Cervical back: Neck supple. No rigidity.      Right lower leg: No edema.      Left lower leg: No edema.   Skin:     General: Skin is warm and dry.      Capillary Refill: Capillary refill takes less than 2 seconds.   Neurological:      General: No focal deficit present.      Mental Status: He is alert and oriented to person, place, and time.      Cranial Nerves: No cranial nerve deficit.      Sensory: No sensory deficit.      Motor: No weakness.      Gait: Gait normal.   Psychiatric:         Mood and Affect: Mood normal.         Behavior: Behavior normal.         Vital Signs  ED Triage Vitals   Temp Pulse Resp BP SpO2   -- -- -- -- --      Temp src Heart Rate Source Patient Position - Orthostatic VS BP Location FiO2 (%)   -- -- -- -- --      Pain Score       --           There were no vitals filed for this visit.      Visual Acuity      ED Medications  Medications - No data to display    Diagnostic Studies  Results Reviewed       None                   No orders to display              Procedures  ECG 12 Lead Documentation Only    Date/Time: 4/8/2024 6:21 AM    Performed by: Nabeel Slaughter DO  Authorized by: Nabeel Slaughter DO    ECG reviewed by me, the ED Provider: yes    Patient location:  ED  Rate:     ECG rate:  71    ECG rate assessment: normal    Rhythm:     Rhythm: sinus rhythm    Ectopy:     Ectopy: none    QRS:     QRS axis:  Normal  Conduction:     Conduction: normal    ST segments:     ST segments:  Normal  T waves:     T waves: normal             ED Course                                             Medical Decision Making  43-year-old male presents with complaint of lightheadedness.  No focal neuro findings.  Suspect it could be related to his use of his wife's muscle relaxant.   Labs pending.             Disposition  Final diagnoses:   None     ED Disposition       None          Follow-up Information    None         Patient's Medications   Discharge Prescriptions    No medications on file       No discharge procedures on file.    PDMP Review       None            ED Provider  Electronically Signed by             Nabeel Slaughter DO  04/08/24 [id number]

## 2024-04-08 NOTE — ED CARE HANDOFF
Emergency Department Sign Out Note        Sign out and transfer of care from Dr. Slaughter. See Separate Emergency Department note.     The patient, Tariq Giraldo, was evaluated by the previous provider for dizziness.    Workup Completed:   labs    ED Course / Workup Pending (followup):  Labs normal, nelidaley medication induced, patient used muscle relaxant for first time.                                     Procedures  Medical Decision Making  Amount and/or Complexity of Data Reviewed  Labs: ordered.            Disposition  Final diagnoses:   Lightheadedness   Adverse effect of drug, initial encounter     Time reflects when diagnosis was documented in both MDM as applicable and the Disposition within this note       Time User Action Codes Description Comment    4/8/2024  6:28 AM Nabeel Slaughter Add [R42] Dizziness     4/8/2024  6:28 AM Nabeel Slaughter Remove [R42] Dizziness     4/8/2024  6:28 AM Nabeel Slaughter Add [R42] Lightheadedness     4/8/2024  7:58 AM Brian Mercado Add [T50.905A] Adverse effect of drug, initial encounter           ED Disposition       ED Disposition   Discharge    Condition   Stable    Date/Time   Mon Apr 8, 2024  7:58 AM    Comment   Tariq Giraldo discharge to home/self care.                   Follow-up Information       Follow up With Specialties Details Why Contact Info    Ross Arroyo,  Internal Medicine   [address] [place] [address] [phone]            Patient's Medications   Discharge Prescriptions    No medications on file     No discharge procedures on file.       ED Provider  Electronically Signed by     Brian Mercado MD  04/08/24 [id number]

## 2024-04-08 NOTE — Clinical Note
Tariq Josephdaviddodie was seen and treated in our emergency department on 4/8/2024.                Diagnosis:     Tariq  .    He may return on this date:     Please excuse from work today.     If you have any questions or concerns, please don't hesitate to call.      Nabeel Slaughter, DO    ______________________________           _______________          _______________  Hospital Representative                              Date                                Time

## 2024-04-09 ENCOUNTER — APPOINTMENT (OUTPATIENT)
Dept: URGENT CARE | Facility: MEDICAL CENTER | Age: 44
End: 2024-04-09

## (undated) DEVICE — UROCATCH BAG

## (undated) DEVICE — GLOVE SRG BIOGEL ECLIPSE 8

## (undated) DEVICE — ENDOSCOPIC VALVE WITH ADAPTER.: Brand: SURSEAL® II

## (undated) DEVICE — PACK TUR

## (undated) DEVICE — LASER FIBER HOLMIUM 272MICRON

## (undated) DEVICE — GLOVE INDICATOR PI UNDERGLOVE SZ 8 BLUE

## (undated) DEVICE — GUIDEWIRE STRGHT TIP 0.035 IN  SOLO PLUS

## (undated) DEVICE — GUIDEWIRE .038 X 145

## (undated) DEVICE — SPECIMEN CONTAINER STERILE PEEL PACK

## (undated) DEVICE — CYSTOSCOPY PACK: Brand: CONVERTORS

## (undated) DEVICE — CATH URETERAL 5FR X 70 CM FLEX TIP POLYUR BARD

## (undated) DEVICE — BASKET STONE RTRVL 4 WIRE HELICAL

## (undated) DEVICE — CATH BAL DIL 6FR 6MM 4CM PERC X-FRC U30 WO INFL DEV

## (undated) DEVICE — INFLATION DEVICE BASIX 30ATM

## (undated) DEVICE — SYRINGE 10ML LL

## (undated) DEVICE — SHEATH URETERAL ACCESS 12/14FR 35CM PROXIS

## (undated) DEVICE — STERILE CYSTO PACK: Brand: CARDINAL HEALTH

## (undated) DEVICE — CYSTO TUBING TUR Y IRRIGATION

## (undated) DEVICE — BASKET STONE RTRVL ZERO TIP 2.4FR

## (undated) DEVICE — STERILE SURGICAL LUBRICANT,  TUBE: Brand: SURGILUBE

## (undated) DEVICE — GLOVE SRG BIOGEL ORTHOPEDIC 7.5

## (undated) DEVICE — URETERAL STENT 6 FR X 22 CM INLAY: Type: IMPLANTABLE DEVICE | Site: URETER | Status: NON-FUNCTIONAL